# Patient Record
Sex: MALE | Race: WHITE | NOT HISPANIC OR LATINO | Employment: UNEMPLOYED | URBAN - METROPOLITAN AREA
[De-identification: names, ages, dates, MRNs, and addresses within clinical notes are randomized per-mention and may not be internally consistent; named-entity substitution may affect disease eponyms.]

---

## 2017-02-21 ENCOUNTER — OFFICE VISIT (OUTPATIENT)
Dept: OPTOMETRY | Facility: CLINIC | Age: 27
End: 2017-02-21
Payer: COMMERCIAL

## 2017-02-21 DIAGNOSIS — S05.02XA CORNEA ABRASION, LEFT, INITIAL ENCOUNTER: Primary | ICD-10-CM

## 2017-02-21 PROCEDURE — 92012 INTRM OPH EXAM EST PATIENT: CPT | Mod: S$GLB,,, | Performed by: OPTOMETRIST

## 2017-02-21 PROCEDURE — 99999 PR PBB SHADOW E&M-EST. PATIENT-LVL II: CPT | Mod: PBBFAC,,, | Performed by: OPTOMETRIST

## 2017-02-21 RX ORDER — AZITHROMYCIN 250 MG/1
TABLET, FILM COATED ORAL
Refills: 0 | COMMUNITY
Start: 2017-01-06 | End: 2018-03-26

## 2017-02-21 RX ORDER — TOBRAMYCIN 3 MG/ML
1 SOLUTION/ DROPS OPHTHALMIC 4 TIMES DAILY
Qty: 5 ML | Refills: 0 | Status: SHIPPED | OUTPATIENT
Start: 2017-02-21 | End: 2017-03-03

## 2017-02-21 RX ORDER — HYDROCODONE BITARTRATE AND IBUPROFEN 7.5; 2 MG/1; MG/1
TABLET, FILM COATED ORAL
Refills: 0 | COMMUNITY
Start: 2017-01-06 | End: 2018-03-26

## 2017-02-21 RX ORDER — TRAMADOL HYDROCHLORIDE 50 MG/1
TABLET ORAL
Refills: 1 | COMMUNITY
Start: 2016-12-30 | End: 2018-03-26

## 2017-02-21 RX ORDER — AMOXICILLIN AND CLAVULANATE POTASSIUM 875; 125 MG/1; MG/1
TABLET, FILM COATED ORAL
Refills: 0 | COMMUNITY
Start: 2016-12-30 | End: 2018-03-26

## 2017-02-21 NOTE — MR AVS SNAPSHOT
[facility]   [facility]  [place] [address]  Phone: [phone]  Fax: [phone]                  Du Hamilton   2017 10:30 AM   Office Visit    Description:  Male : 1990   Provider:  Robin Medrano OD   Department:  [facility]           Reason for Visit     Concerns About Ocular Health           Diagnoses this Visit        Comments    Cornea abrasion, left, initial encounter    -  Primary            To Do List           Goals (5 Years of Data)     None      Follow-Up and Disposition     Return if symptoms worsen or fail to improve.       These Medications        Disp Refills Start End    tobramycin sulfate 0.3% (TOBREX) 0.3 % ophthalmic solution 5 mL 0 2017 3/3/2017    Place 1 drop into the left eye 4 (four) times daily. - Left Eye    Pharmacy: [facility] [address] [place] [address] [place], [place] [address] AT [facility]  #: [phone]         [facility] On Call     [facility] On Call Nurse Care Line -  Assistance  Registered nurses in the [facility] provide clinical advisement, health education, appointment booking, and other advisory services.  Call for this free service at [phone].             Medications           Message regarding Medications     Verify the changes and/or additions to your medication regime listed below are the same as discussed with your clinician today.  If any of these changes or additions are incorrect, please notify your healthcare provider.        START taking these NEW medications        Refills    tobramycin sulfate 0.3% (TOBREX) 0.3 % ophthalmic solution 0    Sig: Place 1 drop into the left eye 4 (four) times daily.    Class: Normal    Route: Left Eye           Verify that the below list of medications is an accurate representation of the medications you are currently taking.  If none reported, the list may be blank. If incorrect, please contact your healthcare provider.  Carry this list with you in case of emergency.           Current Medications     amoxicillin-clavulanate 875-125mg (AUGMENTIN) 875-125 mg per tablet TK 1 T PO  BID    azithromycin (Z-REDD) 250 MG tablet     hydrocodone-acetaminophen 5-325mg (NORCO) 5-325 mg per tablet Take 1 tablet by mouth every 6 (six) hours as needed for Pain.    hydrocodone-ibuprofen 7.5-200 mg (VICOPROFEN) 7.5-200 mg per tablet TK 1 T PO Q 4 TO 6 H PRN P    tobramycin sulfate 0.3% (TOBREX) 0.3 % ophthalmic solution Place 1 drop into the left eye 4 (four) times daily.    tramadol (ULTRAM) 50 mg tablet TK 1 T PO  BID           Clinical Reference Information           Allergies as of 2/21/2017     No Known Allergies      Immunizations Administered on Date of Encounter - 2/21/2017     None      [facility] Sign-Up     Activating your [facility] account is as easy as 1-2-3!     1) Visit [email or web address], select Sign Up Now, enter this activation code and your date of birth, then select Next.  [email or web address]  Expires: 4/7/2017 10:49 AM      2) Create a username and password to use when you visit [facility] in the future and select a security question in case you lose your password and select Next.    3) Enter your e-mail address and click Sign Up!    Additional Information  If you have questions, please e-mail [email or web address] or call [phone] to talk to our [facility] staff. Remember, [facility] is NOT to be used for urgent needs. For medical emergencies, dial 911.         Smoking Cessation     If you would like to quit smoking:   You may be eligible for free services if you are a [place] resident and started smoking cigarettes before September 1, 1988.  Call the Smoking Cessation Trust (SCT) toll free at (280) 055-0056 or (598) 976-1010.   Call 2-203-QUIT-NOW if you do not meet the above criteria.            Language Assistance Services     ATTENTION: Language assistance services are available, free of charge. Please call [phone].       ATENCIÓN: Si habla español, tiene a roque disposición servicios gratuitos de asistencia lingüística. Llred al [phone].     CHÚ Ý: N?u b?n nói Ti?ng Vi?t, có các d?ch v? h? tr? ngôn ng? mi?n phí dành cho b?n. G?i s? [phone].         [facility] complies with applicable Federal civil rights laws and does not discriminate on the basis of race, color, national origin, age, disability, or sex.

## 2017-02-21 NOTE — PROGRESS NOTES
HPI     DLS: 6/9/2016 with Dr. 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 Flushed eye with eye wash    +FBS OS   +Tearing   +Photophobia   +Redness   +White mucous in the left eye this morning.   Pt states he removed a black piece inside the left lower lid    No gtts        Last edited by Annette Rollins on 2/21/2017 10:38 AM.     ROS     Positive for: Eyes    Negative for: Constitutional, Gastrointestinal, Neurological, Skin,   Genitourinary, Musculoskeletal, HENT, Endocrine, Cardiovascular,   Respiratory, Psychiatric, Allergic/Imm, Heme/Lymph    Last edited by Robin Medrano, OD on 2/21/2017 10:55 AM. (History)        Assessment /Plan     For exam results, see Encounter Report.    Cornea abrasion, left, initial encounter  -     tobramycin sulfate 0.3% (TOBREX) 0.3 % ophthalmic solution; Place 1 drop into the left eye 4 (four) times daily.  Dispense: 5 mL; Refill: 0      Pt was in shop yesterday and felt dust blow into OS.  Rinsed eye w eyewash, but still irritation today.  No FB noted, but has superior abrasion most likely from FB he washed out    PLAN:    1. TOBRAMYCIN QID OS X 1 week  2. Pt will call if sx persist beyond 1 week, otherwise he wishes to rtc for full exam since has some problems seeing small print on blueprints

## 2018-03-26 ENCOUNTER — PATIENT MESSAGE (OUTPATIENT)
Dept: FAMILY MEDICINE | Facility: CLINIC | Age: 28
End: 2018-03-26

## 2018-03-26 ENCOUNTER — OFFICE VISIT (OUTPATIENT)
Dept: FAMILY MEDICINE | Facility: CLINIC | Age: 28
End: 2018-03-26
Payer: COMMERCIAL

## 2018-03-26 ENCOUNTER — LAB VISIT (OUTPATIENT)
Dept: LAB | Facility: HOSPITAL | Age: 28
End: 2018-03-26
Attending: FAMILY MEDICINE
Payer: COMMERCIAL

## 2018-03-26 ENCOUNTER — TELEPHONE (OUTPATIENT)
Dept: FAMILY MEDICINE | Facility: CLINIC | Age: 28
End: 2018-03-26

## 2018-03-26 VITALS
SYSTOLIC BLOOD PRESSURE: 114 MMHG | HEART RATE: 73 BPM | DIASTOLIC BLOOD PRESSURE: 70 MMHG | OXYGEN SATURATION: 97 % | BODY MASS INDEX: 29.03 KG/M2 | TEMPERATURE: 98 F | WEIGHT: 191.56 LBS | HEIGHT: 68 IN

## 2018-03-26 DIAGNOSIS — S02.2XXS OPEN FRACTURE OF NASAL BONE, SEQUELA: ICD-10-CM

## 2018-03-26 DIAGNOSIS — Z00.00 VISIT FOR WELL MAN HEALTH CHECK: Primary | ICD-10-CM

## 2018-03-26 DIAGNOSIS — R06.83 SNORING: ICD-10-CM

## 2018-03-26 DIAGNOSIS — D64.9 NORMOCYTIC ANEMIA: ICD-10-CM

## 2018-03-26 DIAGNOSIS — F17.200 TOBACCO DEPENDENCE: ICD-10-CM

## 2018-03-26 DIAGNOSIS — Z71.89 ENCOUNTER FOR HERB AND VITAMIN SUPPLEMENT MANAGEMENT: ICD-10-CM

## 2018-03-26 DIAGNOSIS — Z00.00 VISIT FOR WELL MAN HEALTH CHECK: ICD-10-CM

## 2018-03-26 DIAGNOSIS — Z11.3 ROUTINE SCREENING FOR STI (SEXUALLY TRANSMITTED INFECTION): ICD-10-CM

## 2018-03-26 LAB
25(OH)D3+25(OH)D2 SERPL-MCNC: 51 NG/ML
ALBUMIN SERPL BCP-MCNC: 3.6 G/DL
ALP SERPL-CCNC: 54 U/L
ALT SERPL W/O P-5'-P-CCNC: 45 U/L
ANION GAP SERPL CALC-SCNC: 8 MMOL/L
AST SERPL-CCNC: 43 U/L
BASOPHILS # BLD AUTO: 0.04 K/UL
BASOPHILS NFR BLD: 0.3 %
BILIRUB SERPL-MCNC: 0.3 MG/DL
BUN SERPL-MCNC: 22 MG/DL
CALCIUM SERPL-MCNC: 9.2 MG/DL
CHLORIDE SERPL-SCNC: 104 MMOL/L
CHOLEST SERPL-MCNC: 158 MG/DL
CHOLEST/HDLC SERPL: 6.3 {RATIO}
CO2 SERPL-SCNC: 28 MMOL/L
CREAT SERPL-MCNC: 1.1 MG/DL
DIFFERENTIAL METHOD: ABNORMAL
EOSINOPHIL # BLD AUTO: 0.3 K/UL
EOSINOPHIL NFR BLD: 2 %
ERYTHROCYTE [DISTWIDTH] IN BLOOD BY AUTOMATED COUNT: 13.3 %
EST. GFR  (AFRICAN AMERICAN): >60 ML/MIN/1.73 M^2
EST. GFR  (NON AFRICAN AMERICAN): >60 ML/MIN/1.73 M^2
ESTIMATED AVG GLUCOSE: 88 MG/DL
GLUCOSE SERPL-MCNC: 82 MG/DL
HBA1C MFR BLD HPLC: 4.7 %
HCT VFR BLD AUTO: 43.3 %
HDLC SERPL-MCNC: 25 MG/DL
HDLC SERPL: 15.8 %
HGB BLD-MCNC: 14.2 G/DL
IMM GRANULOCYTES # BLD AUTO: 0.05 K/UL
IMM GRANULOCYTES NFR BLD AUTO: 0.4 %
LDLC SERPL CALC-MCNC: 93.4 MG/DL
LYMPHOCYTES # BLD AUTO: 2.3 K/UL
LYMPHOCYTES NFR BLD: 18.7 %
MCH RBC QN AUTO: 31.4 PG
MCHC RBC AUTO-ENTMCNC: 32.8 G/DL
MCV RBC AUTO: 96 FL
MONOCYTES # BLD AUTO: 1.1 K/UL
MONOCYTES NFR BLD: 8.6 %
NEUTROPHILS # BLD AUTO: 8.7 K/UL
NEUTROPHILS NFR BLD: 70 %
NONHDLC SERPL-MCNC: 133 MG/DL
NRBC BLD-RTO: 0 /100 WBC
PLATELET # BLD AUTO: 278 K/UL
PMV BLD AUTO: 10.7 FL
POTASSIUM SERPL-SCNC: 4.3 MMOL/L
PROT SERPL-MCNC: 6.8 G/DL
RBC # BLD AUTO: 4.52 M/UL
SODIUM SERPL-SCNC: 140 MMOL/L
T4 FREE SERPL-MCNC: 0.85 NG/DL
TRIGL SERPL-MCNC: 198 MG/DL
TSH SERPL DL<=0.005 MIU/L-ACNC: 0.19 UIU/ML
WBC # BLD AUTO: 12.37 K/UL

## 2018-03-26 PROCEDURE — 82306 VITAMIN D 25 HYDROXY: CPT

## 2018-03-26 PROCEDURE — 80053 COMPREHEN METABOLIC PANEL: CPT

## 2018-03-26 PROCEDURE — 80061 LIPID PANEL: CPT

## 2018-03-26 PROCEDURE — 36415 COLL VENOUS BLD VENIPUNCTURE: CPT | Mod: PO

## 2018-03-26 PROCEDURE — 99385 PREV VISIT NEW AGE 18-39: CPT | Mod: S$GLB,,, | Performed by: FAMILY MEDICINE

## 2018-03-26 PROCEDURE — 85025 COMPLETE CBC W/AUTO DIFF WBC: CPT

## 2018-03-26 PROCEDURE — 83036 HEMOGLOBIN GLYCOSYLATED A1C: CPT

## 2018-03-26 PROCEDURE — 99999 PR PBB SHADOW E&M-EST. PATIENT-LVL III: CPT | Mod: PBBFAC,,, | Performed by: FAMILY MEDICINE

## 2018-03-26 PROCEDURE — 84439 ASSAY OF FREE THYROXINE: CPT

## 2018-03-26 PROCEDURE — 84443 ASSAY THYROID STIM HORMONE: CPT

## 2018-03-26 RX ORDER — FLUTICASONE PROPIONATE 50 MCG
1 SPRAY, SUSPENSION (ML) NASAL 2 TIMES DAILY
Qty: 1 BOTTLE | Refills: 11 | Status: SHIPPED | OUTPATIENT
Start: 2018-03-26 | End: 2018-04-25

## 2018-03-26 NOTE — PATIENT INSTRUCTIONS
If you would like to quit smoking:   You may be eligible for free services if you are a [place] resident and started smoking cigarettes before September 1, 1988.  Call the Smoking Cessation Trust (SCT) toll free at (856) 127-5065 or (474) 456-1206.   Call 8-373-QUIT-NOW if you do not meet the above criteria.      Kicking the Smoking Habit  If you smoke, quitting is one of the best changes you can make for your heart and your overall health. Your risk of heart attack goes down within one day of putting out that last cigarette. As you go longer without smoking, your risk goes down even more. Quitting isnt easy, but millions of people have done it. You can, too. Its never too late to quit.  Getting started  Boost your chances of success by deciding on your quit plan. Your health care provider and cardiac rehab team can help you develop this plan. Even if youve already quit, its easy to slip back into smoking.  Your plan can help you avoid and recover from relapse.  In any case, start by setting a date to quit within a month, and do it.    Keys to your quit plan  · Talk to your healthcare provider about prescription medicines and nicotine replacement products that help stop the urge to smoke.   · Join a support group or quit smoking program. Talking with others about the challenges of quitting can help you get through them.  · Ask other smokers in your household to quit with you.  · Look for the cues in your life that you associate with smoking and avoid them.  Track your triggers  What gives you that X-ingq-k-cigarette feeling? List all the situations that make you want a cigarette. Then think of other ways to deal with these situations. Here are some examples:  Situation How I'll handle it   Finishing a meal Get up from the table and take a walk   Having an argument Find a quiet place and breathe deeply   Feeling lonely or bored Call a friend to talk         Tips for quitting successfully  · List the  benefits of quitting such as reducing heart risks and saving money. Keep this list and review it whenever you feel like smoking.  · Get support. Let your friends know you may call them to chat when you have an urge to smoke.  · If youve tried to quit before without success, this time avoid the triggers that may cause the relapse.  · Make the most of slip-ups. Try to learn from them, and then get back on track.  · Be accountable to your friends and your calendar so that you stay on track.  For family and friends  · Be supportive and patient. Quitting smoking can be difficult and stressful.  · If you smoke, nows a great time to quit. Even if you dont quit, never smoke around your loved one. Secondhand smoke is dangerous to his or her heart.  · The best goals are accomplished in teams. Remember that when your loved one states he or she wants to stop smoking.  Date Last Reviewed: 7/1/2016  © 6847-6475 [employer]. [address] [place] [address], [place] [address]. All rights reserved. This information is not intended as a substitute for professional medical care. Always follow your healthcare professional's instructions.      4 Steps for Eating Healthier  Changing the way you eat can improve your health. It can lower your cholesterol and blood pressure, and help you stay at a healthy weight. Your diet doesnt have to be bland and boring to be healthy. Just watch your calories and follow these steps:    1. Eat fewer unhealthy fats  · Choose more fish and lean meats instead of fatty cuts of meat.  · Skip butter and lard, and use less margarine.  · Pass on foods that have palm, coconut, or hydrogenated oils.  · Eat fewer high-fat dairy foods like cheese, ice cream, and whole milk.  · Get a heart-healthy cookbook and try some low-fat recipes.  2. Go light on salt  · Keep the saltshaker off the table.  · Limit high-salt ingredients, such as soy sauce, bouillon, and garlic salt.  · Instead of adding salt when  cooking, season your food with herbs and flavorings. Try lemon, garlic, and onion.  · Limit convenience foods, such as boxed or canned foods and restaurant food.  · Read food labels and choose lower-sodium options.  3. Limit sugar  · Pause before you add sugars to pancakes, cereal, coffee, or tea. This includes white and brown table sugar, syrup, honey, and molasses. Cut your usual amount by half.  · Use non-sugar sweeteners. Stevia, aspartame, and sucralose can satisfy a sweet tooth without adding calories.  · Swap out sugar-filled soda and other drinks. Buy sugar-free or low-calorie beverages. Remember water is always the best choice.  · Read labels and choose foods with less added sugar. Keep in mind that dairy foods and foods with fruit will have some natural sugar.  · Cut the sugar in recipes by 1/3 to 1/2. Boost the flavor with extracts like almond, vanilla, or orange. Or add spices such as cinnamon or nutmeg.  4. Eat more fiber  · Eat fresh fruits and vegetables every day.  · Boost your diet with whole grains. Go for oats, whole-grain rice, and bran.  · Add beans and lentils to your meals.  · Drink more water to match your fiber increase. This is to help prevent constipation.  Date Last Reviewed: 5/11/2015  © 8981-7784 [employer]. [address] [place] [address], [place] [address]. All rights reserved. This information is not intended as a substitute for professional medical care. Always follow your healthcare professional's instructions.

## 2018-03-26 NOTE — PROGRESS NOTES
Subjective:       Patient ID: Du Hamilton is a 27 y.o. male.    Chief Complaint: Annual Exam ([facility])    Du Hamilton is a 27 y.o. male who presents today to establish care.     Diet: his diet is high protein; chicken, steak, egg, salad, shrimp. He eats grains and nuts along with wheat bread. He eats rare fast food. He eats a lot of fried food at home, when he is not traveling. No soda intake. He drinks coffee at work. He drinks juice about 3-4 times a week.   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 He lives [place], his girlfriend. He has a pittbull at home. His girlfriend smokes cigarettes also.         Review of Systems   Constitutional: Negative for chills, fever and unexpected weight change.   Respiratory: Negative for shortness of breath.    Cardiovascular: Negative for chest pain.   Gastrointestinal: Negative for constipation, diarrhea, nausea and vomiting.   Genitourinary: Negative for difficulty urinating.   Musculoskeletal: Negative for arthralgias.   Skin: Negative for rash.         Health Maintenance Due   Topic Date Due    Lipid Panel  1990    Pneumococcal PPSV23 (Medium Risk) (1) 07/18/2006    Influenza Vaccine  08/01/2017     Immunization History   Administered Date(s) Administered    DTaP 1990, 05/08/1989, 08/24/1989, 08/12/1992    HIB 08/18/1992    Hepatitis B, Pediatric/Adolescent 02/11/2003, 08/14/2003, 10/08/2007    IPV 1988, 05/08/1989, 05/05/1990, 08/18/1992    MMR 08/18/1992, 03/22/1994    Td (ADULT) 02/11/2003     Objective:     Vitals:    03/26/18 1308   BP: 114/70   Pulse: 73   Temp: 98.4 °F (36.9 °C)        Physical Exam   Constitutional: He is oriented to person, place, and time. He appears well-developed and well-nourished.   HENT:   Head: Normocephalic  and atraumatic.   Right Ear: Tympanic membrane, external ear and ear canal normal.   Left Ear: Tympanic membrane, external ear and ear canal normal.   Mouth/Throat: Oropharynx is clear and moist and mucous membranes are normal. No tonsillar exudate.   Nasal congestion noted   Eyes: Conjunctivae and EOM are normal. Pupils are equal, round, and reactive to light.   Neck: Normal range of motion. Neck supple.   Cardiovascular: Normal rate, regular rhythm and normal heart sounds.    Pulmonary/Chest: Effort normal and breath sounds normal.   Abdominal: Soft. Bowel sounds are normal. He exhibits no distension.   Musculoskeletal: He exhibits no edema.   Neurological: He is alert and oriented to person, place, and time. A sensory deficit is present. No cranial nerve deficit. He exhibits normal muscle tone. Coordination normal.   Mildly diminished sensation on the left knee, this is chronic s/p surgery   Skin: Skin is warm and dry.   Psychiatric: He has a normal mood and affect. His speech is normal and behavior is normal. Judgment and thought content normal.   Nursing note and vitals reviewed.      Assessment:       1. Visit for [facility] health check    2. Tobacco dependence    3. BMI 29.0-29.9,adult    4. Normocytic anemia    5. Encounter for herb and vitamin supplement management    6. Open fracture of nasal bone, sequela    7. Snoring    8. Routine screening for STI (sexually transmitted infection)        Plan:         Visit for [facility] health check  ?Avoid tobacco  ?Be physically active  ?Maintain a healthy weight  ?Eat a diet rich in fruits, vegetables, and whole grains, and low in saturated/trans fat  ?Limit alcohol consumption  ?Protect against sexually transmitted infections  ?Avoid excess sun  -     CBC auto differential; Future; Expected date: 03/26/2018  -     Comprehensive metabolic panel; Future; Expected date: 03/26/2018  -     Hemoglobin A1c; Future; Expected date: 03/26/2018  -     Lipid panel; Future;  Expected date: 03/26/2018  -     TSH; Future; Expected date: 03/26/2018  -     Vitamin D; Future; Expected date: 03/26/2018  -     T4, free; Future; Expected date: 03/26/2018  -     C. trachomatis/N. gonorrhoeae by AMP DNA Urine  -     HIV-1 and HIV-2 antibodies; Future; Expected date: 03/26/2018  -     RPR; Future; Expected date: 03/26/2018    Tobacco dependence  Desires to quit  Doesn't desire oral medication at this time  Smoking cessation counseling performed  OTC patches or gum to be tried  Handout given    BMI 29.0-29.9,adult  Continue good eating habits and weight training/cardio  Decrease fried food intake    Normocytic anemia  Noted on previous labs, repeated today  -     CBC auto differential; Future; Expected date: 03/26/2018    Encounter for herb and vitamin supplement management  On multiple OTC supplements, counseled to stop them all and just eat healthy    Open fracture of nasal bone, sequela/Snoring  flonase for now  Consider ENT vs plastic surgery to help alleviate snoring s/p nasal fracture after multiple trauma  -     fluticasone (FLONASE) 50 mcg/actuation nasal spray; 1 spray (50 mcg total) by Each Nare route 2 (two) times daily.  Dispense: 1 Bottle; Refill: 11    Routine screening for STI (sexually transmitted infection)  -     C. trachomatis/N. gonorrhoeae by AMP DNA Urine  -     HIV-1 and HIV-2 antibodies; Future; Expected date: 03/26/2018  -     RPR; Future; Expected date: 03/26/2018    Warning signs discussed, patient to call with any further issues or worsening of symptoms.

## 2018-03-27 ENCOUNTER — TELEPHONE (OUTPATIENT)
Dept: FAMILY MEDICINE | Facility: CLINIC | Age: 28
End: 2018-03-27

## 2018-03-27 DIAGNOSIS — E05.90 SUBCLINICAL HYPERTHYROIDISM: ICD-10-CM

## 2018-03-27 DIAGNOSIS — R79.89 ELEVATED LIVER FUNCTION TESTS: ICD-10-CM

## 2018-03-27 DIAGNOSIS — E78.6 LOW HDL (UNDER 40): Primary | ICD-10-CM

## 2018-03-27 NOTE — TELEPHONE ENCOUNTER
Called patient and discussed blood work with him.     He has Subclinical hyperthyroidism. Follow up blood work has been ordered.   He has high triglycerides and low HDL, cholesterol recs sent via the portal.   Elevated LFT: likely due to heavy drinking over the weekend. Reordered, schedule repeat in 12 weeks.       Please order thyroid tests this week.   LFT: 12 weeks  Endocrine referral: when possible.

## 2018-05-22 ENCOUNTER — TELEPHONE (OUTPATIENT)
Dept: FAMILY MEDICINE | Facility: CLINIC | Age: 28
End: 2018-05-22

## 2018-05-22 ENCOUNTER — PATIENT MESSAGE (OUTPATIENT)
Dept: FAMILY MEDICINE | Facility: CLINIC | Age: 28
End: 2018-05-22

## 2018-05-25 ENCOUNTER — LAB VISIT (OUTPATIENT)
Dept: LAB | Facility: HOSPITAL | Age: 28
End: 2018-05-25
Attending: FAMILY MEDICINE
Payer: COMMERCIAL

## 2018-05-25 ENCOUNTER — OFFICE VISIT (OUTPATIENT)
Dept: FAMILY MEDICINE | Facility: CLINIC | Age: 28
End: 2018-05-25
Payer: COMMERCIAL

## 2018-05-25 VITALS
HEART RATE: 88 BPM | TEMPERATURE: 98 F | WEIGHT: 192.69 LBS | OXYGEN SATURATION: 97 % | SYSTOLIC BLOOD PRESSURE: 118 MMHG | HEIGHT: 68 IN | DIASTOLIC BLOOD PRESSURE: 60 MMHG | BODY MASS INDEX: 29.2 KG/M2

## 2018-05-25 DIAGNOSIS — R79.89 ELEVATED LIVER FUNCTION TESTS: ICD-10-CM

## 2018-05-25 DIAGNOSIS — E05.90 SUBCLINICAL HYPERTHYROIDISM: ICD-10-CM

## 2018-05-25 DIAGNOSIS — F17.200 TOBACCO DEPENDENCE: ICD-10-CM

## 2018-05-25 DIAGNOSIS — Z01.00 ENCOUNTER FOR VISION SCREENING: ICD-10-CM

## 2018-05-25 DIAGNOSIS — E05.90 SUBCLINICAL HYPERTHYROIDISM: Primary | ICD-10-CM

## 2018-05-25 DIAGNOSIS — E78.6 LOW HDL (UNDER 40): ICD-10-CM

## 2018-05-25 DIAGNOSIS — Z00.00 VISIT FOR WELL MAN HEALTH CHECK: ICD-10-CM

## 2018-05-25 DIAGNOSIS — Z91.09 ENVIRONMENTAL ALLERGIES: ICD-10-CM

## 2018-05-25 DIAGNOSIS — Z11.3 ROUTINE SCREENING FOR STI (SEXUALLY TRANSMITTED INFECTION): ICD-10-CM

## 2018-05-25 LAB
ALBUMIN SERPL BCP-MCNC: 3.9 G/DL
ALP SERPL-CCNC: 62 U/L
ALT SERPL W/O P-5'-P-CCNC: 48 U/L
ANION GAP SERPL CALC-SCNC: 7 MMOL/L
AST SERPL-CCNC: 45 U/L
BILIRUB SERPL-MCNC: 0.5 MG/DL
BUN SERPL-MCNC: 16 MG/DL
CALCIUM SERPL-MCNC: 9.5 MG/DL
CHLORIDE SERPL-SCNC: 106 MMOL/L
CHOLEST SERPL-MCNC: 161 MG/DL
CHOLEST/HDLC SERPL: 6.2 {RATIO}
CO2 SERPL-SCNC: 28 MMOL/L
CREAT SERPL-MCNC: 1.1 MG/DL
EST. GFR  (AFRICAN AMERICAN): >60 ML/MIN/1.73 M^2
EST. GFR  (NON AFRICAN AMERICAN): >60 ML/MIN/1.73 M^2
GLUCOSE SERPL-MCNC: 82 MG/DL
HDLC SERPL-MCNC: 26 MG/DL
HDLC SERPL: 16.1 %
LDLC SERPL CALC-MCNC: 109.4 MG/DL
NONHDLC SERPL-MCNC: 135 MG/DL
POTASSIUM SERPL-SCNC: 4 MMOL/L
PROT SERPL-MCNC: 7.1 G/DL
SODIUM SERPL-SCNC: 141 MMOL/L
T3FREE SERPL-MCNC: 2.8 PG/ML
T4 FREE SERPL-MCNC: 0.78 NG/DL
TRIGL SERPL-MCNC: 128 MG/DL
TSH SERPL DL<=0.005 MIU/L-ACNC: 0.43 UIU/ML

## 2018-05-25 PROCEDURE — 86592 SYPHILIS TEST NON-TREP QUAL: CPT

## 2018-05-25 PROCEDURE — 84439 ASSAY OF FREE THYROXINE: CPT

## 2018-05-25 PROCEDURE — 80053 COMPREHEN METABOLIC PANEL: CPT

## 2018-05-25 PROCEDURE — 84443 ASSAY THYROID STIM HORMONE: CPT

## 2018-05-25 PROCEDURE — 80061 LIPID PANEL: CPT

## 2018-05-25 PROCEDURE — 83520 IMMUNOASSAY QUANT NOS NONAB: CPT

## 2018-05-25 PROCEDURE — 99999 PR PBB SHADOW E&M-EST. PATIENT-LVL IV: CPT | Mod: PBBFAC,,, | Performed by: FAMILY MEDICINE

## 2018-05-25 PROCEDURE — 84481 FREE ASSAY (FT-3): CPT

## 2018-05-25 PROCEDURE — 86703 HIV-1/HIV-2 1 RESULT ANTBDY: CPT

## 2018-05-25 PROCEDURE — 99214 OFFICE O/P EST MOD 30 MIN: CPT | Mod: S$GLB,,, | Performed by: FAMILY MEDICINE

## 2018-05-25 PROCEDURE — 3008F BODY MASS INDEX DOCD: CPT | Mod: CPTII,S$GLB,, | Performed by: FAMILY MEDICINE

## 2018-05-25 RX ORDER — VARENICLINE TARTRATE 0.5 (11)-1
KIT ORAL
Qty: 42 TABLET | Refills: 0 | Status: SHIPPED | OUTPATIENT
Start: 2018-05-25 | End: 2023-01-14

## 2018-05-25 RX ORDER — FLUTICASONE PROPIONATE 50 MCG
1 SPRAY, SUSPENSION (ML) NASAL DAILY
Qty: 1 BOTTLE | Refills: 11 | Status: SHIPPED | OUTPATIENT
Start: 2018-05-25 | End: 2018-06-24

## 2018-05-25 NOTE — PATIENT INSTRUCTIONS
LDL - bad type - improves with diet and meds. (typically statins)             May not improve significantly with exercise alone             Ideal range 60 -100         HDL - good type - improves with exercise             Ideal range 50 - 65    TGs (triglycerides) - also bad - can change very quickly and considerably with food - improves with diet and exercise            In some cases a low carbohydrate diet will lower TGs better than a low fat diet.            Ideal range       Fat and cholesterol in food:    Most foods that are high in cholesterol are also high in saturated fat. A handful of foods are high in cholesterol but do not contain any saturated fat: eggs, shrimp, crab legs and crawfish. Its OK to eat these foods but do not deep cooper them.    Saturated fat is the bad fat - you should limit your intake of that. Deep fried foods, meats and other animal fats are high in saturated fat. Cookies and most dessert and cakes are usually high in saturated fat and sugar.      Unsaturated fat is the good fat. It contains the same number of calories as saturated fat but does not contribute to blockages. The Mediterranean style diet encourages the intake of unsaturated fat - olive oil, avocado and unsalted nuts.    Do not use stick butter or stick margarine. Butter that comes in a tub or margarine such as Benecol is OK to use.    Trans fats should also be avoided. Most foods that are labelled as containing 0 gms of trans fat can still contain several hundred milligrams of trans fat: creamer, margarine, refrigerator dough, deep fried foods, ready made frosting, potato, corn and torilla chips, cakes, cookies, pie crusts and crackers containing shortening made with hydrogenated vegetable oil.    .  Ganglion Cyst: Hand    A ganglion cyst is a firm, fluid-filled lump that can suddenly appear on the front or back of the wrist or at the base of a finger. These cysts grow from normal tissue in the wrist and fingers, and  range in size from a pea to a peach pit. Although ganglion cysts are common, they dont spread, and they dont become cancerous. They can occur after an injury, but many times it isnt known why they grow. Ganglion cysts can change in size, and may go away on their own.  Symptoms  A ganglion cyst is sometimes painful, especially when it first occurs. Constantly using your hand or wrist can make the cyst enlarge and hurt more. Some hand and wrist movements, such as grasping things, may also be difficult.  How a ganglion cyst develops  Your wrist and hand are made up of many small bones that meet at joints. Tendons attach muscles to the bones at the joints. The tendons allow the joints to bend and straighten. Both tendons and joints are lined with tissue called synovium. This tissue makes a thick fluid that keeps the joints and tendons moving easily. Sometimes the tissue balloons out from the joint or tendons and forms a cyst. As the cyst fills with fluid and grows, it appears as a lump you can feel.  Where ganglion cysts occur  A ganglion cyst can occur anywhere on the hand near a joint. Cysts most commonly appear on the back or palm side of the wrist, or on the palm at the base of a finger. Your doctor can usually diagnose a cyst by examining the lump. He or she may draw off a little fluid or order an X-ray to rule out other problems.  Treating a ganglion cyst  Your healthcare provider may just watch your ganglion cyst. Many shrink and become painless without treatment. Some disappear altogether. If the cyst is unsightly or painful, or makes it hard for you to use your hand, your healthcare provider can treat it or, if needed, remove it surgically.  Nonsurgical treatment  To shrink the cyst, your provider may remove (aspirate) the fluid with a needle. If the cyst hurts, your provider may also give you an injection of an anti-inflammatory, such as cortisone, to relieve the irritation. Your hand may then be wrapped to  help keep the cyst from recurring.  Surgery  If the cyst reappears after treatment, your healthcare provider may remove it surgically. A section of the tissue that lines the joint or tendon is removed along with the cyst. This helps prevent another cyst from forming, although recurrence of the cyst is still possible after surgery. Usually, only your hand or arm is numbed, and you can go home a few hours after surgery. Your hand may be in a splint for several days.  Date Last Reviewed: 9/10/2015  © 4845-1952 [employer]. [address] [place] [address]. All rights reserved. This information is not intended as a substitute for professional medical care. Always follow your healthcare professional's instructions.

## 2018-05-25 NOTE — PROGRESS NOTES
Subjective:       Patient ID: Du Hamilton is a 27 y.o. male.    Chief Complaint: Follow-up (3 mos)    Du is a 27 y.o. male who presents today for desire to quit smoking, follow up on his lab work, and elevated LFTs.     Today, he states that ge desires to quit smoking. He is smoking about 0.5 PPD, this has been going on intermittently for about 10 years. He is motivated and ready to quit. He desires chantix.     His lab work, as below, was reviewed in detail.  He was found to have subclinical hyperthyroidism for the second lab test in a row.  The first test was not ordered by me and it does not appear that there was any follow-up.  Patient did not get his follow-up blood work drawn.  He has not gone to see endocrinology yet either.    During his lab work, he was also found to have elevated LFTs.  He states that right before he got the blood work done, he had been drinking heavily.  He now states that he has been drinking 2-3 weeks.  He desires to get his blood work redrawn.    He was also found to have low HDL.  He desires to recheck his lipid panel as he has changed his diet and exercise.    He also desires STI screening.       Thyroid Problem   Presents for initial visit. Symptoms include dry skin and fatigue. Patient reports no constipation, diaphoresis, diarrhea, hair loss, hoarse voice, leg swelling, nail problem, palpitations, tremors, weight gain or weight loss. The symptoms have been stable. (Binge alcohol use)   Nicotine Dependence   Presents for initial visit. Symptoms include fatigue. Preferred tobacco types include cigarettes. His urge triggers include company of smokers and drinking alcohol. He smokes < 1/2 a pack of cigarettes per day. Past treatments include nothing. Compliance with prior treatments has been variable. Du is ready to quit. There is no history of drug use. (Binge alcohol use)     Review of Systems   Constitutional: Positive for fatigue. Negative for diaphoresis, weight gain and  weight loss.   HENT: Negative for hoarse voice.    Cardiovascular: Negative for palpitations.   Gastrointestinal: Negative for constipation and diarrhea.   Neurological: Negative for tremors.         Results for orders placed or performed in visit on 03/26/18   CBC auto differential   Result Value Ref Range    WBC 12.37 3.90 - 12.70 K/uL    RBC 4.52 (L) 4.60 - 6.20 M/uL    Hemoglobin 14.2 14.0 - 18.0 g/dL    Hematocrit 43.3 40.0 - 54.0 %    MCV 96 82 - 98 fL    MCH 31.4 (H) 27.0 - 31.0 pg    MCHC 32.8 32.0 - 36.0 g/dL    RDW 13.3 11.5 - 14.5 %    Platelets 278 150 - 350 K/uL    MPV 10.7 9.2 - 12.9 fL   Comprehensive metabolic panel   Result Value Ref Range    Sodium 140 136 - 145 mmol/L    Potassium 4.3 3.5 - 5.1 mmol/L    Chloride 104 95 - 110 mmol/L    CO2 28 23 - 29 mmol/L    Glucose 82 70 - 110 mg/dL    BUN, Bld 22 (H) 6 - 20 mg/dL    Creatinine 1.1 0.5 - 1.4 mg/dL    Calcium 9.2 8.7 - 10.5 mg/dL    Total Protein 6.8 6.0 - 8.4 g/dL    Albumin 3.6 3.5 - 5.2 g/dL    Total Bilirubin 0.3 0.1 - 1.0 mg/dL    Alkaline Phosphatase 54 (L) 55 - 135 U/L    AST 43 (H) 10 - 40 U/L    ALT 45 (H) 10 - 44 U/L    Anion Gap 8 8 - 16 mmol/L    eGFR if African American >60.0 >60 mL/min/1.73 m^2    eGFR if non African American >60.0 >60 mL/min/1.73 m^2   Lipid panel   Result Value Ref Range    Cholesterol 158 120 - 199 mg/dL    Triglycerides 198 (H) 30 - 150 mg/dL    HDL 25 (L) 40 - 75 mg/dL    LDL Cholesterol 93.4 63.0 - 159.0 mg/dL    HDL/Chol Ratio 15.8 (L) 20.0 - 50.0 %    Total Cholesterol/HDL Ratio 6.3 (H) 2.0 - 5.0    Non-HDL Cholesterol 133 mg/dL   TSH   Result Value Ref Range    TSH 0.193 (L) 0.400 - 4.000 uIU/mL   T4, free   Result Value Ref Range    Free T4 0.85 0.71 - 1.51 ng/dL       Objective:     Vitals:    05/25/18 1602   BP: 118/60   Pulse: 88   Temp: 98.4 °F (36.9 °C)        Physical Exam   Constitutional: He is oriented to person, place, and time. He appears well-developed and well-nourished.   HENT:   Head:  Normocephalic and atraumatic.   Nasal congestion noted  Mild cobblestoning noted   Eyes: Conjunctivae are normal. Pupils are equal, round, and reactive to light.   Neck: Normal range of motion. Neck supple. No tracheal deviation present. No thyromegaly present.   Cardiovascular: Normal rate and regular rhythm.    Pulmonary/Chest: Effort normal and breath sounds normal.   Abdominal: Soft. There is no tenderness.   Musculoskeletal: He exhibits no edema.   Neurological: He is alert and oriented to person, place, and time.   Skin: Skin is warm. No rash noted.   Psychiatric: He has a normal mood and affect. His behavior is normal. Judgment and thought content normal.   Nursing note and vitals reviewed.      Assessment:       1. Subclinical hyperthyroidism    2. Elevated liver function tests    3. Tobacco dependence    4. Routine screening for STI (sexually transmitted infection)    5. Low HDL (under 40)    6. Encounter for vision screening    7. Environmental allergies        Plan:         Subclinical hyperthyroidism  Check TSH, T4, T3, thyrotripin receptor antibody  Endocrine referral.     Elevated liver function tests  CMP today    Tobacco dependence  -     varenicline (CHANTIX REDD) 0.5 mg (11)- 1 mg (42) tablet; Take one 0.5mg tablet by mouth once daily for 3 days, then increase to one 0.5mg tablet twice daily for 3 days, then increase to one 1mg tablet twice daily.    1 mg twice daily for 11 weeks; may consider a temporary or permanent dose reduction if usual dose is not tolerated.  Dispense: 42 tablet; Refill: 0    Routine screening for STI (sexually transmitted infection)  HIV, RPR, G/C today    Low HDL (under 40)  -     Lipid panel; Future; Expected date: 05/25/2018    Encounter for vision screening  -     Ambulatory referral to Optometry    Environmental allergies  -     fluticasone (FLONASE) 50 mcg/actuation nasal spray; 1 spray (50 mcg total) by Each Nare route once daily.  Dispense: 1 Bottle; Refill:  11      Warning signs discussed, patient to call with any further issues or worsening of symptoms.

## 2018-05-26 LAB — RPR SER QL: NORMAL

## 2018-05-28 ENCOUNTER — TELEPHONE (OUTPATIENT)
Dept: FAMILY MEDICINE | Facility: CLINIC | Age: 28
End: 2018-05-28

## 2018-05-28 DIAGNOSIS — R79.89 ELEVATED LIVER FUNCTION TESTS: Primary | ICD-10-CM

## 2018-05-28 DIAGNOSIS — E05.90 SUBCLINICAL HYPERTHYROIDISM: ICD-10-CM

## 2018-05-28 LAB — HIV 1+2 AB+HIV1 P24 AG SERPL QL IA: NEGATIVE

## 2018-05-28 NOTE — TELEPHONE ENCOUNTER
Please call the patient regarding his results. His STI screening (HIV and syphillis) was negative. His thyroid tests have normalized, but his autoimmune marker is not back yet. Since his thyroid tests appear to be normal, he doesn't need to see endocrine unless the last blood tests comes back positive. We can just monitor his TSH every 6-12 months. He continues to have elevated liver function tests. I have ordered an US of his liver and some other blood tests for this.     His urine G/C was never drawn, please schedule this at the same time as his other blood work.     [place], TSH/T4 is to be scheduled in 6-12 months. All other tests can be scheduled now.

## 2018-05-29 LAB — TSH RECEP AB SER-ACNC: <1 IU/L

## 2018-05-30 ENCOUNTER — TELEPHONE (OUTPATIENT)
Dept: FAMILY MEDICINE | Facility: CLINIC | Age: 28
End: 2018-05-30

## 2018-05-30 NOTE — TELEPHONE ENCOUNTER
Spoke with patient and informed STD work up negative. Informed pt he will have to submit the urine specimen in order for the G/C to be performed. Informed thyroid function has normalized and no need to f/u with Endo at this time. Will recheck thyroid in 6/12 months. Informed patient liver enzymes are still elevated and will need liver ultrasound and work up with labs. Patient reports he is leaving to go to [facility] and will not be back until July. Patient request all test to be done on July 20th. Instructed patient to completed test once he returns. Patient voices understanding.

## 2018-05-30 NOTE — TELEPHONE ENCOUNTER
----- Message from Karla Edward sent at 5/30/2018 12:20 PM CDT -----  No. [phone]   Patient returned your call.

## 2018-05-31 ENCOUNTER — OFFICE VISIT (OUTPATIENT)
Dept: OPTOMETRY | Facility: CLINIC | Age: 28
End: 2018-05-31
Payer: COMMERCIAL

## 2018-05-31 DIAGNOSIS — H04.123 DRY EYE SYNDROME OF BILATERAL LACRIMAL GLANDS: Primary | ICD-10-CM

## 2018-05-31 DIAGNOSIS — H17.9 CORNEAL OPACITY OF BOTH EYES: ICD-10-CM

## 2018-05-31 PROCEDURE — 92014 COMPRE OPH EXAM EST PT 1/>: CPT | Mod: S$GLB,,, | Performed by: OPTOMETRIST

## 2018-05-31 PROCEDURE — 99999 PR PBB SHADOW E&M-EST. PATIENT-LVL II: CPT | Mod: PBBFAC,,, | Performed by: OPTOMETRIST

## 2018-05-31 NOTE — LETTER
May 31, 2018      Justyn Arciniega, DO  [address] [facility] [place] [address]           [place] [facility]  [address]  [place] [address]  Phone: [phone]  Fax: [phone]          Patient: Du Hamilton   MR Number: [id number]   YOB: 1990   Date of Visit: 5/31/2018       Dear Dr. Justyn Arciniega:    Thank you for referring Du Hamilton to me for evaluation. Attached you will find relevant portions of my assessment and plan of care.    If you have questions, please do not hesitate to call me. I look forward to following Du Hamilton along with you.    Sincerely,    Marilu Olivier, OD    Enclosure  CC:  No Recipients    If you would like to receive this communication electronically, please contact [email or web address] or [phone] to request more information on [facility] access.    For providers and/or their staff who would like to refer a patient to [facility], please contact us through our one-stop-shop provider referral line, [facility] , at [phone].    If you feel you have received this communication in error or would no longer like to receive these types of communications, please e-mail [email or web address]
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
## 2020-05-30 ENCOUNTER — OFFICE VISIT (OUTPATIENT)
Dept: URGENT CARE | Facility: CLINIC | Age: 30
End: 2020-05-30
Payer: COMMERCIAL

## 2020-05-30 VITALS
BODY MASS INDEX: 29.55 KG/M2 | OXYGEN SATURATION: 96 % | TEMPERATURE: 97 F | WEIGHT: 195 LBS | HEIGHT: 68 IN | HEART RATE: 77 BPM

## 2020-05-30 DIAGNOSIS — S69.91XA HAND INJURY, RIGHT, INITIAL ENCOUNTER: ICD-10-CM

## 2020-05-30 DIAGNOSIS — S63.91XA HAND SPRAIN, RIGHT, INITIAL ENCOUNTER: Primary | ICD-10-CM

## 2020-05-30 PROCEDURE — 73130 X-RAY EXAM OF HAND: CPT | Mod: FY,RT,S$GLB, | Performed by: RADIOLOGY

## 2020-05-30 PROCEDURE — 73130 XR HAND COMPLETE 3 VIEW RIGHT: ICD-10-PCS | Mod: FY,RT,S$GLB, | Performed by: RADIOLOGY

## 2020-05-30 PROCEDURE — 99214 OFFICE O/P EST MOD 30 MIN: CPT | Mod: S$GLB,,, | Performed by: PHYSICIAN ASSISTANT

## 2020-05-30 PROCEDURE — 99214 PR OFFICE/OUTPT VISIT, EST, LEVL IV, 30-39 MIN: ICD-10-PCS | Mod: S$GLB,,, | Performed by: PHYSICIAN ASSISTANT
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
## 2020-05-30 NOTE — PATIENT INSTRUCTIONS
Hand Sprain  A sprain is a stretching or tearing of the ligaments that hold a joint together. There are no broken bones. Sprains take 3 to 6 weeks to heal. A sprained hand may be treated with a splint or elastic wrap for support.  Home care  · Keep your arm elevated to reduce pain and swelling. This is most important during the first 48 hours.  · Apply an ice pack over the injured area for 15 to 20 minutes every 3 to 6 hours. You should do this for the first 24 to 48 hours. You can make an ice pack by filling a plastic bag that seals at the top with ice cubes and then wrapping it with a thin towel. Continue the use of ice packs for relief of pain and swelling as needed. As the ice melts, be careful to avoid getting any wrap or splint wet. After 48 hours, apply heat (warm shower or warm bath) for 15 to 20 minutes several times a day, or alternate ice and heat.  · You may use over-the-counter pain medicine to control pain, unless another pain medicine was prescribed. If you have chronic liver or kidney disease or ever had a stomach ulcer or GI bleeding, talk with your healthcare provider before using these medicines.  · If you were given a splint or elastic wrap, wear it until your pain improves.  Follow-up care  Follow up with your healthcare provider as advised. Sometimes fractures dont show up on the first X-ray. Bruises and sprains can sometimes hurt as much as a fracture. These injuries can take time to heal completely. If your symptoms dont improve or they get worse, talk with your healthcare provider. You may need a repeat X-ray.  When to seek medical advice  Call your healthcare provider right away if any of these occur:  · Pain or swelling increases  · Fingers or hand becomes cold, blue, numb, or tingly  Date Last Reviewed: 11/20/2015  © 5487-2138 [employer]. [address] [place] [address], [place] [address]. All rights reserved. This information is not intended as a substitute for professional  medical care. Always follow your healthcare professional's instructions.      Please follow up with your Primary care provider within 2-5 days if your signs and symptoms have not resolved or worsen.     If your condition worsens or fails to improve we recommend that you receive another evaluation at the emergency room immediately or contact your primary medical clinic to discuss your concerns.   You must understand that you have received an Urgent Care treatment only and that you may be released before all of your medical problems are known or treated. You, the patient, will arrange for follow up care as instructed.     RED FLAGS/WARNING SYMPTOMS DISCUSSED WITH PATIENT THAT WOULD WARRANT EMERGENT MEDICAL ATTENTION. PATIENT VERBALIZED UNDERSTANDING.

## 2020-06-02 ENCOUNTER — TELEPHONE (OUTPATIENT)
Dept: URGENT CARE | Facility: CLINIC | Age: 30
End: 2020-06-02

## 2020-10-05 ENCOUNTER — PATIENT MESSAGE (OUTPATIENT)
Dept: ADMINISTRATIVE | Facility: HOSPITAL | Age: 30
End: 2020-10-05

## 2021-01-05 ENCOUNTER — PATIENT MESSAGE (OUTPATIENT)
Dept: ADMINISTRATIVE | Facility: HOSPITAL | Age: 31
End: 2021-01-05

## 2021-04-05 ENCOUNTER — PATIENT MESSAGE (OUTPATIENT)
Dept: ADMINISTRATIVE | Facility: HOSPITAL | Age: 31
End: 2021-04-05

## 2021-04-16 ENCOUNTER — PATIENT MESSAGE (OUTPATIENT)
Dept: RESEARCH | Facility: HOSPITAL | Age: 31
End: 2021-04-16

## 2021-09-30 ENCOUNTER — OFFICE VISIT (OUTPATIENT)
Dept: FAMILY MEDICINE | Facility: CLINIC | Age: 31
End: 2021-09-30
Payer: COMMERCIAL

## 2021-09-30 VITALS
SYSTOLIC BLOOD PRESSURE: 122 MMHG | HEIGHT: 68 IN | WEIGHT: 191.38 LBS | BODY MASS INDEX: 29.01 KG/M2 | OXYGEN SATURATION: 97 % | HEART RATE: 66 BPM | DIASTOLIC BLOOD PRESSURE: 74 MMHG

## 2021-09-30 DIAGNOSIS — N48.89 PENILE PAIN: Primary | ICD-10-CM

## 2021-09-30 DIAGNOSIS — Z00.00 ANNUAL PHYSICAL EXAM: ICD-10-CM

## 2021-09-30 DIAGNOSIS — G89.29 CHRONIC PAIN OF LEFT KNEE: ICD-10-CM

## 2021-09-30 DIAGNOSIS — M25.562 CHRONIC PAIN OF LEFT KNEE: ICD-10-CM

## 2021-09-30 PROCEDURE — 3078F DIAST BP <80 MM HG: CPT | Mod: CPTII,S$GLB,, | Performed by: INTERNAL MEDICINE

## 2021-09-30 PROCEDURE — 99385 PR PREVENTIVE VISIT,NEW,18-39: ICD-10-PCS | Mod: S$GLB,,, | Performed by: INTERNAL MEDICINE

## 2021-09-30 PROCEDURE — 3008F PR BODY MASS INDEX (BMI) DOCUMENTED: ICD-10-PCS | Mod: CPTII,S$GLB,, | Performed by: INTERNAL MEDICINE

## 2021-09-30 PROCEDURE — 1159F MED LIST DOCD IN RCRD: CPT | Mod: CPTII,S$GLB,, | Performed by: INTERNAL MEDICINE

## 2021-09-30 PROCEDURE — 99999 PR PBB SHADOW E&M-EST. PATIENT-LVL IV: ICD-10-PCS | Mod: PBBFAC,,, | Performed by: INTERNAL MEDICINE

## 2021-09-30 PROCEDURE — 3078F PR MOST RECENT DIASTOLIC BLOOD PRESSURE < 80 MM HG: ICD-10-PCS | Mod: CPTII,S$GLB,, | Performed by: INTERNAL MEDICINE

## 2021-09-30 PROCEDURE — 99999 PR PBB SHADOW E&M-EST. PATIENT-LVL IV: CPT | Mod: PBBFAC,,, | Performed by: INTERNAL MEDICINE

## 2021-09-30 PROCEDURE — 1160F PR REVIEW ALL MEDS BY PRESCRIBER/CLIN PHARMACIST DOCUMENTED: ICD-10-PCS | Mod: CPTII,S$GLB,, | Performed by: INTERNAL MEDICINE

## 2021-09-30 PROCEDURE — 1160F RVW MEDS BY RX/DR IN RCRD: CPT | Mod: CPTII,S$GLB,, | Performed by: INTERNAL MEDICINE

## 2021-09-30 PROCEDURE — 99385 PREV VISIT NEW AGE 18-39: CPT | Mod: S$GLB,,, | Performed by: INTERNAL MEDICINE

## 2021-09-30 PROCEDURE — 3074F SYST BP LT 130 MM HG: CPT | Mod: CPTII,S$GLB,, | Performed by: INTERNAL MEDICINE

## 2021-09-30 PROCEDURE — 3008F BODY MASS INDEX DOCD: CPT | Mod: CPTII,S$GLB,, | Performed by: INTERNAL MEDICINE

## 2021-09-30 PROCEDURE — 3074F PR MOST RECENT SYSTOLIC BLOOD PRESSURE < 130 MM HG: ICD-10-PCS | Mod: CPTII,S$GLB,, | Performed by: INTERNAL MEDICINE

## 2021-09-30 PROCEDURE — 1159F PR MEDICATION LIST DOCUMENTED IN MEDICAL RECORD: ICD-10-PCS | Mod: CPTII,S$GLB,, | Performed by: INTERNAL MEDICINE

## 2021-09-30 RX ORDER — MELOXICAM 7.5 MG/1
7.5 TABLET ORAL DAILY
Qty: 30 TABLET | Refills: 0 | Status: SHIPPED | OUTPATIENT
Start: 2021-09-30 | End: 2023-01-14

## 2021-09-30 RX ORDER — TIZANIDINE 2 MG/1
2 TABLET ORAL NIGHTLY PRN
Qty: 20 TABLET | Refills: 1 | Status: SHIPPED | OUTPATIENT
Start: 2021-09-30 | End: 2021-10-10

## 2021-10-01 ENCOUNTER — TELEPHONE (OUTPATIENT)
Dept: FAMILY MEDICINE | Facility: CLINIC | Age: 31
End: 2021-10-01

## 2022-08-16 ENCOUNTER — NURSE TRIAGE (OUTPATIENT)
Dept: ADMINISTRATIVE | Facility: CLINIC | Age: 32
End: 2022-08-16
Payer: COMMERCIAL

## 2022-08-16 NOTE — TELEPHONE ENCOUNTER
Mr. Hamilton is calling to report that he tested positive for COVID at work this am and he is feeling very poorly.  He reports a severe cough and sore throat, feeling like his equilibrium is off causing him difficulty with balance when walking,  Sweating and feeling faint/weak.  I advised he call 911 now, but he states he can drive himself.  I reiterated that he should not be driving, should call an ambulance to bring him to the ED.  He verbalized understanding.  Reason for Disposition   Shock suspected (e.g., cold/pale/clammy skin, too weak to stand, low BP, rapid pulse)    Additional Information   Negative: SEVERE difficulty breathing (e.g., struggling for each breath, speaks in single words)   Negative: Difficult to awaken or acting confused (e.g., disoriented, slurred speech)   Negative: Bluish (or gray) lips or face now    Protocols used: CORONAVIRUS (COVID-19) DIAGNOSED OR [id number]

## 2023-01-14 ENCOUNTER — ANESTHESIA (OUTPATIENT)
Dept: SURGERY | Facility: HOSPITAL | Age: 33
End: 2023-01-14
Payer: COMMERCIAL

## 2023-01-14 ENCOUNTER — HOSPITAL ENCOUNTER (OUTPATIENT)
Facility: HOSPITAL | Age: 33
Discharge: HOME | End: 2023-01-14
Attending: STUDENT IN AN ORGANIZED HEALTH CARE EDUCATION/TRAINING PROGRAM | Admitting: SURGERY
Payer: COMMERCIAL

## 2023-01-14 ENCOUNTER — ANESTHESIA EVENT (OUTPATIENT)
Dept: SURGERY | Facility: HOSPITAL | Age: 33
End: 2023-01-14
Payer: COMMERCIAL

## 2023-01-14 VITALS
SYSTOLIC BLOOD PRESSURE: 122 MMHG | BODY MASS INDEX: 29.01 KG/M2 | HEIGHT: 68 IN | WEIGHT: 191.38 LBS | TEMPERATURE: 98 F | DIASTOLIC BLOOD PRESSURE: 81 MMHG | HEART RATE: 67 BPM | OXYGEN SATURATION: 100 % | RESPIRATION RATE: 18 BRPM

## 2023-01-14 DIAGNOSIS — K35.30 ACUTE APPENDICITIS WITH LOCALIZED PERITONITIS, WITHOUT PERFORATION, ABSCESS, OR GANGRENE: ICD-10-CM

## 2023-01-14 DIAGNOSIS — R10.9 ABDOMINAL PAIN, UNSPECIFIED ABDOMINAL LOCATION: ICD-10-CM

## 2023-01-14 DIAGNOSIS — R10.31 RIGHT LOWER QUADRANT ABDOMINAL PAIN: ICD-10-CM

## 2023-01-14 DIAGNOSIS — R10.9 ABDOMINAL PAIN, UNSPECIFIED ABDOMINAL LOCATION: Primary | ICD-10-CM

## 2023-01-14 PROBLEM — K35.80 ACUTE APPENDICITIS: Status: ACTIVE | Noted: 2023-01-14

## 2023-01-14 LAB
ALBUMIN SERPL BCP-MCNC: 4 G/DL (ref 3.5–5.2)
ALP SERPL-CCNC: 47 U/L (ref 55–135)
ALT SERPL W/O P-5'-P-CCNC: 28 U/L (ref 10–44)
ANION GAP SERPL CALC-SCNC: 9 MMOL/L (ref 8–16)
AST SERPL-CCNC: 21 U/L (ref 10–40)
BASOPHILS # BLD AUTO: 0.02 K/UL (ref 0–0.2)
BASOPHILS NFR BLD: 0.4 % (ref 0–1.9)
BILIRUB SERPL-MCNC: 0.9 MG/DL (ref 0.1–1)
BILIRUB UR QL STRIP: NEGATIVE
BUN SERPL-MCNC: 22 MG/DL (ref 6–20)
CALCIUM SERPL-MCNC: 9.6 MG/DL (ref 8.7–10.5)
CHLORIDE SERPL-SCNC: 106 MMOL/L (ref 95–110)
CLARITY UR: CLEAR
CO2 SERPL-SCNC: 24 MMOL/L (ref 23–29)
COLOR UR: COLORLESS
CREAT SERPL-MCNC: 1.1 MG/DL (ref 0.5–1.4)
DIFFERENTIAL METHOD: ABNORMAL
EOSINOPHIL # BLD AUTO: 0.1 K/UL (ref 0–0.5)
EOSINOPHIL NFR BLD: 2.4 % (ref 0–8)
ERYTHROCYTE [DISTWIDTH] IN BLOOD BY AUTOMATED COUNT: 12.4 % (ref 11.5–14.5)
EST. GFR  (NO RACE VARIABLE): >60 ML/MIN/1.73 M^2
GLUCOSE SERPL-MCNC: 161 MG/DL (ref 70–110)
GLUCOSE UR QL STRIP: NEGATIVE
HCT VFR BLD AUTO: 43.1 % (ref 40–54)
HGB BLD-MCNC: 15 G/DL (ref 14–18)
HGB UR QL STRIP: NEGATIVE
IMM GRANULOCYTES # BLD AUTO: 0.02 K/UL (ref 0–0.04)
IMM GRANULOCYTES NFR BLD AUTO: 0.4 % (ref 0–0.5)
KETONES UR QL STRIP: NEGATIVE
LEUKOCYTE ESTERASE UR QL STRIP: NEGATIVE
LIPASE SERPL-CCNC: 18 U/L (ref 4–60)
LYMPHOCYTES # BLD AUTO: 0.8 K/UL (ref 1–4.8)
LYMPHOCYTES NFR BLD: 14.6 % (ref 18–48)
MCH RBC QN AUTO: 31.9 PG (ref 27–31)
MCHC RBC AUTO-ENTMCNC: 34.8 G/DL (ref 32–36)
MCV RBC AUTO: 92 FL (ref 82–98)
MONOCYTES # BLD AUTO: 0.5 K/UL (ref 0.3–1)
MONOCYTES NFR BLD: 9.9 % (ref 4–15)
NEUTROPHILS # BLD AUTO: 4 K/UL (ref 1.8–7.7)
NEUTROPHILS NFR BLD: 72.3 % (ref 38–73)
NITRITE UR QL STRIP: NEGATIVE
NRBC BLD-RTO: 0 /100 WBC
PH UR STRIP: 6 [PH] (ref 5–8)
PLATELET # BLD AUTO: 224 K/UL (ref 150–450)
PMV BLD AUTO: 9.8 FL (ref 9.2–12.9)
POTASSIUM SERPL-SCNC: 4 MMOL/L (ref 3.5–5.1)
PROT SERPL-MCNC: 7.6 G/DL (ref 6–8.4)
PROT UR QL STRIP: ABNORMAL
RBC # BLD AUTO: 4.7 M/UL (ref 4.6–6.2)
SODIUM SERPL-SCNC: 139 MMOL/L (ref 136–145)
SP GR UR STRIP: >1.03 (ref 1–1.03)
URN SPEC COLLECT METH UR: ABNORMAL
UROBILINOGEN UR STRIP-ACNC: NEGATIVE EU/DL
WBC # BLD AUTO: 5.48 K/UL (ref 3.9–12.7)

## 2023-01-14 PROCEDURE — 27201423 OPTIME MED/SURG SUP & DEVICES STERILE SUPPLY: Performed by: SURGERY

## 2023-01-14 PROCEDURE — 36000709 HC OR TIME LEV III EA ADD 15 MIN: Performed by: SURGERY

## 2023-01-14 PROCEDURE — 44970 PR LAP,APPENDECTOMY: ICD-10-PCS | Mod: ,,, | Performed by: SURGERY

## 2023-01-14 PROCEDURE — 37000009 HC ANESTHESIA EA ADD 15 MINS: Performed by: SURGERY

## 2023-01-14 PROCEDURE — 85025 COMPLETE CBC W/AUTO DIFF WBC: CPT | Performed by: STUDENT IN AN ORGANIZED HEALTH CARE EDUCATION/TRAINING PROGRAM

## 2023-01-14 PROCEDURE — 36000708 HC OR TIME LEV III 1ST 15 MIN: Performed by: SURGERY

## 2023-01-14 PROCEDURE — 96365 THER/PROPH/DIAG IV INF INIT: CPT | Mod: 59

## 2023-01-14 PROCEDURE — 81003 URINALYSIS AUTO W/O SCOPE: CPT | Performed by: STUDENT IN AN ORGANIZED HEALTH CARE EDUCATION/TRAINING PROGRAM

## 2023-01-14 PROCEDURE — 25000003 PHARM REV CODE 250: Performed by: NURSE ANESTHETIST, CERTIFIED REGISTERED

## 2023-01-14 PROCEDURE — D9220A PRA ANESTHESIA: ICD-10-PCS | Mod: CRNA,,, | Performed by: NURSE ANESTHETIST, CERTIFIED REGISTERED

## 2023-01-14 PROCEDURE — 25500020 PHARM REV CODE 255: Performed by: STUDENT IN AN ORGANIZED HEALTH CARE EDUCATION/TRAINING PROGRAM

## 2023-01-14 PROCEDURE — 37000008 HC ANESTHESIA 1ST 15 MINUTES: Performed by: SURGERY

## 2023-01-14 PROCEDURE — 71000033 HC RECOVERY, INTIAL HOUR: Performed by: SURGERY

## 2023-01-14 PROCEDURE — G0378 HOSPITAL OBSERVATION PER HR: HCPCS

## 2023-01-14 PROCEDURE — 25000003 PHARM REV CODE 250: Performed by: STUDENT IN AN ORGANIZED HEALTH CARE EDUCATION/TRAINING PROGRAM

## 2023-01-14 PROCEDURE — 63600175 PHARM REV CODE 636 W HCPCS: Performed by: SURGERY

## 2023-01-14 PROCEDURE — 25000003 PHARM REV CODE 250: Performed by: SURGERY

## 2023-01-14 PROCEDURE — 88304 TISSUE EXAM BY PATHOLOGIST: CPT | Mod: 26,,, | Performed by: PATHOLOGY

## 2023-01-14 PROCEDURE — 88304 PR  SURG PATH,LEVEL III: ICD-10-PCS | Mod: 26,,, | Performed by: PATHOLOGY

## 2023-01-14 PROCEDURE — D9220A PRA ANESTHESIA: Mod: CRNA,,, | Performed by: NURSE ANESTHETIST, CERTIFIED REGISTERED

## 2023-01-14 PROCEDURE — D9220A PRA ANESTHESIA: Mod: ANES,,, | Performed by: STUDENT IN AN ORGANIZED HEALTH CARE EDUCATION/TRAINING PROGRAM

## 2023-01-14 PROCEDURE — 96375 TX/PRO/DX INJ NEW DRUG ADDON: CPT

## 2023-01-14 PROCEDURE — 63600175 PHARM REV CODE 636 W HCPCS: Performed by: STUDENT IN AN ORGANIZED HEALTH CARE EDUCATION/TRAINING PROGRAM

## 2023-01-14 PROCEDURE — 44970 LAPAROSCOPY APPENDECTOMY: CPT | Mod: ,,, | Performed by: SURGERY

## 2023-01-14 PROCEDURE — D9220A PRA ANESTHESIA: ICD-10-PCS | Mod: ANES,,, | Performed by: STUDENT IN AN ORGANIZED HEALTH CARE EDUCATION/TRAINING PROGRAM

## 2023-01-14 PROCEDURE — 99214 OFFICE O/P EST MOD 30 MIN: CPT | Mod: 57,,, | Performed by: SURGERY

## 2023-01-14 PROCEDURE — 80053 COMPREHEN METABOLIC PANEL: CPT | Performed by: STUDENT IN AN ORGANIZED HEALTH CARE EDUCATION/TRAINING PROGRAM

## 2023-01-14 PROCEDURE — 96361 HYDRATE IV INFUSION ADD-ON: CPT

## 2023-01-14 PROCEDURE — 88304 TISSUE EXAM BY PATHOLOGIST: CPT | Performed by: PATHOLOGY

## 2023-01-14 PROCEDURE — 63600175 PHARM REV CODE 636 W HCPCS: Performed by: NURSE ANESTHETIST, CERTIFIED REGISTERED

## 2023-01-14 PROCEDURE — 83690 ASSAY OF LIPASE: CPT | Performed by: STUDENT IN AN ORGANIZED HEALTH CARE EDUCATION/TRAINING PROGRAM

## 2023-01-14 PROCEDURE — 99214 PR OFFICE/OUTPT VISIT, EST, LEVL IV, 30-39 MIN: ICD-10-PCS | Mod: 57,,, | Performed by: SURGERY

## 2023-01-14 PROCEDURE — 99285 EMERGENCY DEPT VISIT HI MDM: CPT | Mod: 25

## 2023-01-14 RX ORDER — PROCHLORPERAZINE EDISYLATE 5 MG/ML
5 INJECTION INTRAMUSCULAR; INTRAVENOUS EVERY 30 MIN PRN
Status: DISCONTINUED | OUTPATIENT
Start: 2023-01-14 | End: 2023-01-14 | Stop reason: HOSPADM

## 2023-01-14 RX ORDER — ONDANSETRON 2 MG/ML
INJECTION INTRAMUSCULAR; INTRAVENOUS
Status: DISCONTINUED | OUTPATIENT
Start: 2023-01-14 | End: 2023-01-14

## 2023-01-14 RX ORDER — FENTANYL CITRATE 50 UG/ML
INJECTION, SOLUTION INTRAMUSCULAR; INTRAVENOUS
Status: DISCONTINUED | OUTPATIENT
Start: 2023-01-14 | End: 2023-01-14

## 2023-01-14 RX ORDER — ONDANSETRON 2 MG/ML
4 INJECTION INTRAMUSCULAR; INTRAVENOUS DAILY PRN
Status: DISCONTINUED | OUTPATIENT
Start: 2023-01-14 | End: 2023-01-14 | Stop reason: HOSPADM

## 2023-01-14 RX ORDER — LIDOCAINE HCL/PF 100 MG/5ML
SYRINGE (ML) INTRAVENOUS
Status: DISCONTINUED | OUTPATIENT
Start: 2023-01-14 | End: 2023-01-14

## 2023-01-14 RX ORDER — DEXAMETHASONE SODIUM PHOSPHATE 4 MG/ML
INJECTION, SOLUTION INTRA-ARTICULAR; INTRALESIONAL; INTRAMUSCULAR; INTRAVENOUS; SOFT TISSUE
Status: DISCONTINUED | OUTPATIENT
Start: 2023-01-14 | End: 2023-01-14

## 2023-01-14 RX ORDER — BUPIVACAINE HYDROCHLORIDE 5 MG/ML
INJECTION, SOLUTION PERINEURAL
Status: DISCONTINUED | OUTPATIENT
Start: 2023-01-14 | End: 2023-01-14 | Stop reason: HOSPADM

## 2023-01-14 RX ORDER — ROCURONIUM BROMIDE 10 MG/ML
INJECTION, SOLUTION INTRAVENOUS
Status: DISCONTINUED | OUTPATIENT
Start: 2023-01-14 | End: 2023-01-14

## 2023-01-14 RX ORDER — MIDAZOLAM HYDROCHLORIDE 1 MG/ML
INJECTION INTRAMUSCULAR; INTRAVENOUS
Status: DISCONTINUED | OUTPATIENT
Start: 2023-01-14 | End: 2023-01-14

## 2023-01-14 RX ORDER — PROPOFOL 10 MG/ML
VIAL (ML) INTRAVENOUS
Status: DISCONTINUED | OUTPATIENT
Start: 2023-01-14 | End: 2023-01-14

## 2023-01-14 RX ORDER — OSELTAMIVIR PHOSPHATE 75 MG/1
75 CAPSULE ORAL 2 TIMES DAILY
COMMUNITY
Start: 2022-11-28 | End: 2023-01-14 | Stop reason: ALTCHOICE

## 2023-01-14 RX ORDER — HYDROMORPHONE HYDROCHLORIDE 2 MG/ML
0.5 INJECTION, SOLUTION INTRAMUSCULAR; INTRAVENOUS; SUBCUTANEOUS EVERY 5 MIN PRN
Status: DISCONTINUED | OUTPATIENT
Start: 2023-01-14 | End: 2023-01-14 | Stop reason: HOSPADM

## 2023-01-14 RX ORDER — SODIUM CHLORIDE 0.9 % (FLUSH) 0.9 %
10 SYRINGE (ML) INJECTION
Status: DISCONTINUED | OUTPATIENT
Start: 2023-01-14 | End: 2023-01-14 | Stop reason: HOSPADM

## 2023-01-14 RX ORDER — LIDOCAINE HYDROCHLORIDE 10 MG/ML
INJECTION INFILTRATION; PERINEURAL
Status: DISCONTINUED | OUTPATIENT
Start: 2023-01-14 | End: 2023-01-14 | Stop reason: HOSPADM

## 2023-01-14 RX ORDER — SUCCINYLCHOLINE CHLORIDE 20 MG/ML
INJECTION INTRAMUSCULAR; INTRAVENOUS
Status: DISCONTINUED | OUTPATIENT
Start: 2023-01-14 | End: 2023-01-14

## 2023-01-14 RX ORDER — ONDANSETRON 2 MG/ML
4 INJECTION INTRAMUSCULAR; INTRAVENOUS
Status: COMPLETED | OUTPATIENT
Start: 2023-01-14 | End: 2023-01-14

## 2023-01-14 RX ORDER — KETOROLAC TROMETHAMINE 10 MG/1
10 TABLET, FILM COATED ORAL EVERY 6 HOURS PRN
Qty: 12 TABLET | Refills: 0 | Status: SHIPPED | OUTPATIENT
Start: 2023-01-14 | End: 2023-01-17

## 2023-01-14 RX ORDER — OXYCODONE AND ACETAMINOPHEN 5; 325 MG/1; MG/1
1 TABLET ORAL EVERY 6 HOURS PRN
Qty: 12 TABLET | Refills: 0 | Status: SHIPPED | OUTPATIENT
Start: 2023-01-14 | End: 2023-01-21

## 2023-01-14 RX ORDER — MORPHINE SULFATE 4 MG/ML
4 INJECTION, SOLUTION INTRAMUSCULAR; INTRAVENOUS
Status: COMPLETED | OUTPATIENT
Start: 2023-01-14 | End: 2023-01-14

## 2023-01-14 RX ORDER — SODIUM CHLORIDE, SODIUM LACTATE, POTASSIUM CHLORIDE, CALCIUM CHLORIDE 600; 310; 30; 20 MG/100ML; MG/100ML; MG/100ML; MG/100ML
INJECTION, SOLUTION INTRAVENOUS CONTINUOUS PRN
Status: DISCONTINUED | OUTPATIENT
Start: 2023-01-14 | End: 2023-01-14

## 2023-01-14 RX ORDER — OXYCODONE HYDROCHLORIDE 5 MG/1
5 TABLET ORAL
Status: DISCONTINUED | OUTPATIENT
Start: 2023-01-14 | End: 2023-01-14 | Stop reason: HOSPADM

## 2023-01-14 RX ORDER — OXYCODONE AND ACETAMINOPHEN 5; 325 MG/1; MG/1
1 TABLET ORAL EVERY 4 HOURS PRN
Status: DISCONTINUED | OUTPATIENT
Start: 2023-01-14 | End: 2023-01-14 | Stop reason: HOSPADM

## 2023-01-14 RX ORDER — SODIUM CHLORIDE, SODIUM LACTATE, POTASSIUM CHLORIDE, CALCIUM CHLORIDE 600; 310; 30; 20 MG/100ML; MG/100ML; MG/100ML; MG/100ML
INJECTION, SOLUTION INTRAVENOUS CONTINUOUS
Status: DISCONTINUED | OUTPATIENT
Start: 2023-01-14 | End: 2023-01-14

## 2023-01-14 RX ADMIN — MIDAZOLAM HYDROCHLORIDE 2 MG: 1 INJECTION, SOLUTION INTRAMUSCULAR; INTRAVENOUS at 02:01

## 2023-01-14 RX ADMIN — ONDANSETRON HYDROCHLORIDE 4 MG: 2 SOLUTION INTRAMUSCULAR; INTRAVENOUS at 09:01

## 2023-01-14 RX ADMIN — MORPHINE SULFATE 4 MG: 4 INJECTION INTRAVENOUS at 09:01

## 2023-01-14 RX ADMIN — SUGAMMADEX 200 MG: 100 INJECTION, SOLUTION INTRAVENOUS at 03:01

## 2023-01-14 RX ADMIN — IOHEXOL 100 ML: 350 INJECTION, SOLUTION INTRAVENOUS at 09:01

## 2023-01-14 RX ADMIN — SODIUM CHLORIDE, SODIUM LACTATE, POTASSIUM CHLORIDE, AND CALCIUM CHLORIDE: .6; .31; .03; .02 INJECTION, SOLUTION INTRAVENOUS at 02:01

## 2023-01-14 RX ADMIN — OXYCODONE HYDROCHLORIDE 5 MG: 5 TABLET ORAL at 04:01

## 2023-01-14 RX ADMIN — FENTANYL CITRATE 100 MCG: 0.05 INJECTION, SOLUTION INTRAMUSCULAR; INTRAVENOUS at 02:01

## 2023-01-14 RX ADMIN — ROCURONIUM BROMIDE 5 MG: 10 INJECTION, SOLUTION INTRAVENOUS at 02:01

## 2023-01-14 RX ADMIN — ROCURONIUM BROMIDE 25 MG: 10 INJECTION, SOLUTION INTRAVENOUS at 02:01

## 2023-01-14 RX ADMIN — PIPERACILLIN AND TAZOBACTAM 4.5 G: 4; .5 INJECTION, POWDER, LYOPHILIZED, FOR SOLUTION INTRAVENOUS; PARENTERAL at 12:01

## 2023-01-14 RX ADMIN — SODIUM CHLORIDE, SODIUM LACTATE, POTASSIUM CHLORIDE, AND CALCIUM CHLORIDE: .6; .31; .03; .02 INJECTION, SOLUTION INTRAVENOUS at 11:01

## 2023-01-14 RX ADMIN — SODIUM CHLORIDE 1000 ML: 0.9 INJECTION, SOLUTION INTRAVENOUS at 09:01

## 2023-01-14 RX ADMIN — ONDANSETRON 4 MG: 2 INJECTION, SOLUTION INTRAMUSCULAR; INTRAVENOUS at 02:01

## 2023-01-14 RX ADMIN — DEXAMETHASONE SODIUM PHOSPHATE 4 MG: 4 INJECTION, SOLUTION INTRA-ARTICULAR; INTRALESIONAL; INTRAMUSCULAR; INTRAVENOUS; SOFT TISSUE at 02:01

## 2023-01-14 RX ADMIN — LIDOCAINE HYDROCHLORIDE 100 MG: 20 INJECTION, SOLUTION INTRAVENOUS at 02:01

## 2023-01-14 RX ADMIN — SUCCINYLCHOLINE CHLORIDE 140 MG: 20 INJECTION, SOLUTION INTRAMUSCULAR; INTRAVENOUS at 02:01

## 2023-01-14 RX ADMIN — PROPOFOL 200 MG: 10 INJECTION, EMULSION INTRAVENOUS at 02:01

## 2023-01-14 NOTE — ANESTHESIA POSTPROCEDURE EVALUATION
Anesthesia Post Evaluation    Patient: Du Hamilton    Procedure(s) Performed: Procedure(s) (LRB):  APPENDECTOMY, LAPAROSCOPIC (N/A)    Final Anesthesia Type: general      Patient location during evaluation: med/surg floor  Patient participation: Yes- Able to Participate  Level of consciousness: awake and alert  Post-procedure vital signs: reviewed and stable  Pain management: adequate  Airway patency: patent    PONV status at discharge: No PONV  Anesthetic complications: no      Cardiovascular status: blood pressure returned to baseline and hemodynamically stable  Respiratory status: unassisted, spontaneous ventilation and room air  Hydration status: euvolemic  Follow-up not needed.          Vitals Value Taken Time   /71 01/14/23 1611   Temp 36.6 °C (97.9 °F) 01/14/23 1610   Pulse 48 01/14/23 1610   Resp 20 01/14/23 1610   SpO2 100 % 01/14/23 1610   Vitals shown include unvalidated device data.      No case tracking events are documented in the log.      Pain/Rasheed Score: Pain Rating Prior to Med Admin: 4 (1/14/2023  4:06 PM)  Pain Rating Post Med Admin: 0 (1/14/2023  3:25 PM)  Rasheed Score: 8 (1/14/2023  3:25 PM)

## 2023-01-14 NOTE — ED NOTES
Present to ED with RLQ abd pain for 3 days. Denies taking on OTC meds. Denies n/v/d or dysuria. + right lower back pain. Denies chest pain or SOB.     APPEARANCE: Alert, oriented and in no acute distress.  CARDIAC: Normal rate and rhythm, no murmur heard.   RESPIRATORY:Normal rate and effort, breath sounds clear bilaterally throughout chest. Respirations are equal and unlabored no obvious signs of distress.  GASTRO: soft, bowel sounds normal, no tenderness, no abdominal distention. +RLQ abd pain  NEURO: 5/5 strength major flexors/extensors bilaterally. Sensory intact to light touch bilaterally. Jeannie coma scale: eyes open spontaneously-4, oriented & converses-5, obeys commands-6. No neurological abnormalities.   MENTAL STATUS: awake, alert and aware of environment.

## 2023-01-14 NOTE — PHARMACY MED REC
"  [facility]           Pharmacy  Admission Medication History     The home medication history was taken by Rashida Yung.      Medication history obtained from Medications listed below were obtained from: Patient/family. Patient verified he is only taking a vitamin    Based on information gathered for medication list, you may go to "Admission" then "Reconcile Home Medications" tabs to review and/or act upon those items.     The home medication list has been updated by the Pharmacy department.   Please read ALL comments highlighted in yellow.   Please address this information as you see fit.    Feel free to contact us if you have any questions or require assistance.    The medications listed below were removed from the home medication list.  Please reorder if appropriate:    Patient reports NOT TAKING the following medication(s):  Tamiflu 75mg  Chantix ariadne 0.5mg-1mg  Mobic 7.5mg    No current facility-administered medications on file prior to encounter.     Current Outpatient Medications on File Prior to Encounter   Medication Sig Dispense Refill    multivitamin capsule Take 1 capsule by mouth once daily.         Please address this information as you see fit.  Feel free to contact us if you have any questions or require assistance.    Rashida Yung  [phone]                .        "
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
## 2023-01-14 NOTE — NURSING
Patient discharged from PACU s/p lap appendectomy. Patient alert and awake. Vitals taken. Lap sites covered with band-aid, intact with scanty drainage. Oriented to room set up. Call light wihtin reach. Bed alarm on.

## 2023-01-14 NOTE — OP NOTE
PATIENT: Du Hamilton    MRN: [id number]    Date of Procedure:  01/14/2023    Surgeon: Jon Stevenson M.D.    Assistant: none    Procedure: Laparoscopic Appendectomy    Pre-Operative Diagnosis: Right lower quadrant pain, appendix borderline enlarged on imaging    Post-Operative Diagnosis: Minimal appendiceal inflammation    Anesthesia: General    Specimen: Appendix    Complications: none    Estimated Blood Loss (EBL): minimal    INDICATIONS: The patient is a 32 y.o. male  who presents to the emergency room with non-classic symptoms for appendicitis and appendix noted to be at upper limit of normal size with inflammation on CT. The patient was given options to discharge with close follow-up, admission for observation of symptoms, or diagnostic laparoscopy with appendectomy.  Pt elected for later although I told him I felt it was low chance he had appendicitis.  Pt was counseled on their options for treatment and they expressed the desire to proceed with surgical intervention. The risks of the procedure were described to the patient including bleeding, infection, pain, scarring, wound complications including hernias, injury to local structures, potential need for further surgery, perioperative cardiac/pulmonary/thromboembolic events, and possible death.    PROCEDURE:  After surgical consent was obtained, the patient was transported to the operative theater and onto the operating room table in the supine position.  General endotracheal anesthesia was administered without difficulty.  Perioperative antibiotics were given and a time-out was performed in order to ensure the proper patient and procedure.  The patient's abdomen was prepped and draped in a standard sterile fashion.  An incision was made in the skin around the umbilicus and carried down bluntly to the fascia.  The fascia was sharply divided and the abdominal cavity was entered bluntly.  A trocar was inserted without difficulty and the abdomen was insufflated.   Two additional trocars were inserted under direct visualization and without issue. The patient was placed in the Trendelenburg position with the left side down.  The appendix was identified and there was minimal inflammation at the mid portion. The base of the appendix was identified and a window was made in the mesoappendix.  The appendiceal base was then stapled and divided using at Endo stapler with a tissue load.  The mesoappendix was similarly divided with a ligasure device  The appendix was placed in a specimen bag and removed from the field.  The right lower quadrant was irrigated out with sterile saline.  The staple line was intact.  We desufflated the abdomen under direct visualization and removed all trocars.  The fascial defect at the periumbilical incision was closed using a figure-of-eight 0 Vicryl suture.  The incisions were irrigated out with sterile saline and reinjected with local anesthetic.  The skin openings were closed using interrupted Monocryl stitches and covered with a sterile Band-Aid.  At the end of the procedure the instrument, lap, and needle counts were correct.  The patient was awoken from anesthesia having tolerated the procedure without difficulty and was returned to the PACU in a stable condition.  Any available family was updated and all questions were answered.    Jon Stevenson M.D., F.A.C.S.  [facility] and General Surgery  [facility] [place]

## 2023-01-14 NOTE — ANESTHESIA RELEASE NOTE
"Anesthesia Release from PACU Note    Patient: Du Hamilton    Procedure(s) Performed: Procedure(s) (LRB):  APPENDECTOMY, LAPAROSCOPIC (N/A)    Anesthesia type: general    Post pain: Adequate analgesia    Post assessment: no apparent anesthetic complications, tolerated procedure well and no evidence of recall    Last Vitals:   Visit Vitals  /71   Pulse 62   Temp 36.5 °C (97.7 °F) (Skin)   Resp 16   Ht 5' 8" (1.727 m)   Wt 86.2 kg (190 lb)   SpO2 100%   BMI 28.89 kg/m²       Post vital signs: stable    Level of consciousness: awake    Nausea/Vomiting: no nausea/no vomiting    Complications: none    Airway Patency: patent    Respiratory: unassisted, spontaneous ventilation, room air    Cardiovascular: stable and blood pressure at baseline    Hydration: euvolemic  "
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
## 2023-01-14 NOTE — ANESTHESIA PREPROCEDURE EVALUATION
01/14/2023  Du Hamilton is a 32 y.o., male w abd pain for diagnostic laparoscopy.     Afebrile, no leukocytosis, HDS on RA.    Pre-op Assessment    I have reviewed the Patient Summary Reports.     I have reviewed the Nursing Notes. I have reviewed the NPO Status.      Review of Systems  Anesthesia Hx:   Denies Personal Hx of Anesthesia complications.   Hematology/Oncology:         -- Denies Anemia:   Cardiovascular:    Denies Angina.    Pulmonary:   Denies Shortness of breath.    Renal/:   Denies Chronic Renal Disease.     Neurological:  Neurology Normal    Psych:   depression          Physical Exam  General: Alert and Oriented    Airway:  Mallampati: II   Mouth Opening: Normal  TM Distance: Normal  Tongue: Normal  Neck ROM: Normal ROM    Dental:  Intact    Chest/Lungs:  Clear to auscultation, Normal Respiratory Rate    Heart:  Rate: Normal  Rhythm: Regular Rhythm        Anesthesia Plan  Type of Anesthesia, risks & benefits discussed:    Anesthesia Type: Gen ETT  Intra-op Monitoring Plan: Standard ASA Monitors  Post Op Pain Control Plan: multimodal analgesia and IV/PO Opioids PRN  Induction:  IV  Airway Plan: Direct, Post-Induction  Informed Consent: Informed consent signed with the Patient and all parties understand the risks and agree with anesthesia plan.  All questions answered.   ASA Score: 2    Ready For Surgery From Anesthesia Perspective.     .    Lab Results   Component Value Date    WBC 5.48 01/14/2023    HGB 15.0 01/14/2023    HCT 43.1 01/14/2023     01/14/2023    CHOL 161 05/25/2018    TRIG 128 05/25/2018    HDL 26 (L) 05/25/2018    ALT 28 01/14/2023    AST 21 01/14/2023     01/14/2023    K 4.0 01/14/2023     01/14/2023    CREATININE 1.1 01/14/2023    BUN 22 (H) 01/14/2023    CO2 24 01/14/2023    TSH 0.426 05/25/2018    HGBA1C 4.7 03/26/2018

## 2023-01-14 NOTE — PLAN OF CARE
Received in room 508 postop via stretcher,sr upx2 in care of Bahman Bianchi & Beata Rn-or. Assisted to bed,sr upx3. Recovery phase started.  
Recovery phase discharge criteria met, signed out per Dr Fuller. Report given to Gildardo Orr-5A.  
(4) no limitation

## 2023-01-14 NOTE — TRANSFER OF CARE
"Anesthesia Transfer of Care Note    Patient: Du Hamilton    Procedure(s) Performed: Procedure(s) (LRB):  APPENDECTOMY, LAPAROSCOPIC (N/A)    Patient location: Other: pt recovered on [facility] floor by PACU staff    Anesthesia Type: general    Transport from OR: Transported from OR on 6-10 L/min O2 by face mask with adequate spontaneous ventilation    Post pain: adequate analgesia    Post assessment: no apparent anesthetic complications and tolerated procedure well    Post vital signs: stable    Level of consciousness: awake and responds to stimulation    Nausea/Vomiting: no nausea/vomiting    Complications: none    Transfer of care protocol was followed      Last vitals:   Visit Vitals  /71   Pulse 62   Temp 36.5 °C (97.7 °F) (Skin)   Resp 16   Ht 5' 8" (1.727 m)   Wt 86.2 kg (190 lb)   SpO2 100%   BMI 28.89 kg/m²     "

## 2023-01-15 NOTE — DISCHARGE SUMMARY
[facility]  Discharge Summary  General Surgery      Admit Date: 1/14/2023    Discharge Date: 1/14/2023  6:10 PM    Attending Physician/Discharge Provider: Jon Stevenson M.D.    Reason for Admission: Abdominal pain    Procedures Performed: Procedure(s) (LRB):  APPENDECTOMY, LAPAROSCOPIC (N/A)    Hospital Course:  Patient is a 32-year old male who presented with intermittent RLQ pains and non-classic symptoms for acute appendicitis.  CT showed no inflammation and only appendix at upper limit of normal size.  We discussed DC with close observation vs admission for symptom monitoring vs diagnostic laparoscopy.  Pt had preference for diagnostic laparoscopy. Appendix was minimally inflamed outwardly and was removed.  Pt was discharged home after recovering from surgery.    Final Diagnoses:   Principal Problem: Acute appendicitis       Discharged Condition: Good    Disposition: Home or Self Care    Follow Up/Patient Instructions: Follow up with Dr. Stevenson in 2 weeks.    Medications:  Reconciled Home Medications:      Medication List        START taking these medications      ketorolac 10 mg tablet  Commonly known as: TORADOL  Take 1 tablet (10 mg total) by mouth every 6 (six) hours as needed.     oxyCODONE-acetaminophen 5-325 mg per tablet  Commonly known as: PERCOCET  Take 1 tablet by mouth every 6 (six) hours as needed for Pain.            CONTINUE taking these medications      multivitamin capsule  Take 1 capsule by mouth once daily.            Discharge Procedure Orders   Diet Adult Regular     Ice to affected area     Lifting restrictions   Order Comments: 20 lbs x 3 weeks     No dressing needed   Order Comments: Ok to shower in 24 hours, no bathing or swimming x 2 weeks         Activity: As tolerated.  Diet: reg  Wound Care: As above.      Jon Stevenson M.D., F.A.C.S.  [facility] General Surgery  [facility] [place]

## 2023-01-15 NOTE — NURSING
Patient with dc orders in. Patient able to tolerate regular food without nausea. Patient ambulated to bathroom to urinate 1x. Patient with steady gait. PIV removed. AVS printed and reviewed with patient. Patient wheeled down to lobby per wheelchair.

## 2023-01-20 ENCOUNTER — TELEPHONE (OUTPATIENT)
Dept: SURGERY | Facility: CLINIC | Age: 33
End: 2023-01-20
Payer: COMMERCIAL

## 2023-01-20 NOTE — TELEPHONE ENCOUNTER
----- Message from Damian Moraes sent at 1/20/2023  1:57 PM CST -----  .Type:  Needs Medical Advice    Who Called: pt    Would the patient rather a call back or a response via [facility]? Call back  Best Call Back Number: [phone]  Additional Information:      Pt states that he missed a call and needs a call back concerning some paperwork he need filled out          01/20/2023                     1412  Informed patient his paperwork is completed and he can pick it up from the [email or web address] desk. Also let him know someone would be there until 1630. Patient verbalized understanding.

## 2023-01-20 NOTE — TELEPHONE ENCOUNTER
----- Message from Leydi Farias sent at 1/20/2023  9:26 AM CST -----  Type:  Needs Medical Advice    Who Called: pt  Symptoms (please be specific): pt is requesting to get a return call from this office in regards to some return to work paperwork        Would the patient rather a call back or a response via [facility]? call  Best Call Back Number: [phone]  Additional Information:       01/20/2023                 1337  Attempted to contact patient regarding the above message. No answer. Could not leave message because voicemail is not set up.

## 2023-01-23 LAB
FINAL PATHOLOGIC DIAGNOSIS: NORMAL
GROSS: NORMAL
Lab: NORMAL

## 2023-01-27 ENCOUNTER — OFFICE VISIT (OUTPATIENT)
Dept: SURGERY | Facility: CLINIC | Age: 33
End: 2023-01-27
Payer: COMMERCIAL

## 2023-01-27 VITALS
HEART RATE: 70 BPM | WEIGHT: 192.38 LBS | DIASTOLIC BLOOD PRESSURE: 81 MMHG | HEIGHT: 68 IN | SYSTOLIC BLOOD PRESSURE: 126 MMHG | BODY MASS INDEX: 29.16 KG/M2

## 2023-01-27 DIAGNOSIS — K35.30 ACUTE APPENDICITIS WITH LOCALIZED PERITONITIS, WITHOUT PERFORATION, ABSCESS, OR GANGRENE: Primary | ICD-10-CM

## 2023-01-27 PROCEDURE — 3079F PR MOST RECENT DIASTOLIC BLOOD PRESSURE 80-89 MM HG: ICD-10-PCS | Mod: CPTII,S$GLB,, | Performed by: SURGERY

## 2023-01-27 PROCEDURE — 3074F SYST BP LT 130 MM HG: CPT | Mod: CPTII,S$GLB,, | Performed by: SURGERY

## 2023-01-27 PROCEDURE — 3008F PR BODY MASS INDEX (BMI) DOCUMENTED: ICD-10-PCS | Mod: CPTII,S$GLB,, | Performed by: SURGERY

## 2023-01-27 PROCEDURE — 1160F PR REVIEW ALL MEDS BY PRESCRIBER/CLIN PHARMACIST DOCUMENTED: ICD-10-PCS | Mod: CPTII,S$GLB,, | Performed by: SURGERY

## 2023-01-27 PROCEDURE — 3008F BODY MASS INDEX DOCD: CPT | Mod: CPTII,S$GLB,, | Performed by: SURGERY

## 2023-01-27 PROCEDURE — 99024 PR POST-OP FOLLOW-UP VISIT: ICD-10-PCS | Mod: S$GLB,,, | Performed by: SURGERY

## 2023-01-27 PROCEDURE — 99024 POSTOP FOLLOW-UP VISIT: CPT | Mod: S$GLB,,, | Performed by: SURGERY

## 2023-01-27 PROCEDURE — 3079F DIAST BP 80-89 MM HG: CPT | Mod: CPTII,S$GLB,, | Performed by: SURGERY

## 2023-01-27 PROCEDURE — 1160F RVW MEDS BY RX/DR IN RCRD: CPT | Mod: CPTII,S$GLB,, | Performed by: SURGERY

## 2023-01-27 PROCEDURE — 99999 PR PBB SHADOW E&M-EST. PATIENT-LVL III: CPT | Mod: PBBFAC,,, | Performed by: SURGERY

## 2023-01-27 PROCEDURE — 99999 PR PBB SHADOW E&M-EST. PATIENT-LVL III: ICD-10-PCS | Mod: PBBFAC,,, | Performed by: SURGERY

## 2023-01-27 PROCEDURE — 1159F PR MEDICATION LIST DOCUMENTED IN MEDICAL RECORD: ICD-10-PCS | Mod: CPTII,S$GLB,, | Performed by: SURGERY

## 2023-01-27 PROCEDURE — 1159F MED LIST DOCD IN RCRD: CPT | Mod: CPTII,S$GLB,, | Performed by: SURGERY

## 2023-01-27 PROCEDURE — 3074F PR MOST RECENT SYSTOLIC BLOOD PRESSURE < 130 MM HG: ICD-10-PCS | Mod: CPTII,S$GLB,, | Performed by: SURGERY

## 2023-01-27 NOTE — PROGRESS NOTES
[facility]  POST-OP NOTE    HPI: Du Hamilton is a 32 y.o. male s/p lap appy.  Doing well.  No complaints.  Pre-op symptoms resolved.      VITALS:  Vitals:    01/27/23 0902   BP: 126/81   Pulse: 70       PHYSICAL EXAM:  GEN: NAD  HEENT: NCAT  ABD: incisions C/D/I    PATHOLOGY:  Reviewed      ASSESSMENT & PLAN:  32 y.o. male s/p lap appy.  RTC as needed.  Ok to resume normal activity.       Jon Stevenson M.D., F.A.C.S.  [facility] and General Surgery  [facility] [place]

## 2023-01-27 NOTE — LETTER
January 27, 2023    Du Hamilton  [address] [place] [address]         [facility]  [address]  [facility] [address]  Phone: [phone] January 27, 2023     Patient: Du Hamilton   YOB: 1990   Date of Visit: 1/27/2023       To Whom It May Concern:    Du Hamilton may return to full duty work on 01/30/2023 with no restrictions.    If you have any questions or concerns, please don't hesitate to call.    Sincerely,        Jon Stevenson MD

## 2023-03-06 ENCOUNTER — PATIENT OUTREACH (OUTPATIENT)
Dept: ADMINISTRATIVE | Facility: HOSPITAL | Age: 33
End: 2023-03-06
Payer: COMMERCIAL

## 2023-03-06 ENCOUNTER — PATIENT MESSAGE (OUTPATIENT)
Dept: ADMINISTRATIVE | Facility: HOSPITAL | Age: 33
End: 2023-03-06
Payer: COMMERCIAL

## 2023-03-06 NOTE — PROGRESS NOTES
Care Everywhere updates requested and reviewed.  Immunizations reconciled. Media reports reviewed.  Duplicate HM overrides and  orders removed.  Overdue HM topic chart audit and/or requested.  Overdue lab testing linked to upcoming lab appointments if applies.      Health Maintenance Due   Topic Date Due    Hepatitis C Screening  Never done    COVID-19 Vaccine (1) Never done    Influenza Vaccine (1) Never done

## 2023-10-21 ENCOUNTER — OFFICE VISIT (OUTPATIENT)
Dept: URGENT CARE | Facility: CLINIC | Age: 33
End: 2023-10-21
Payer: COMMERCIAL

## 2023-10-21 VITALS
SYSTOLIC BLOOD PRESSURE: 124 MMHG | DIASTOLIC BLOOD PRESSURE: 78 MMHG | RESPIRATION RATE: 16 BRPM | OXYGEN SATURATION: 96 % | HEIGHT: 68 IN | WEIGHT: 185 LBS | TEMPERATURE: 98 F | HEART RATE: 102 BPM | BODY MASS INDEX: 28.04 KG/M2

## 2023-10-21 DIAGNOSIS — Z20.2 STD EXPOSURE: Primary | ICD-10-CM

## 2023-10-21 LAB
BILIRUB UR QL STRIP: NEGATIVE
GLUCOSE UR QL STRIP: NEGATIVE
KETONES UR QL STRIP: NEGATIVE
LEUKOCYTE ESTERASE UR QL STRIP: NEGATIVE
PH, POC UA: 5
POC BLOOD, URINE: NEGATIVE
POC NITRATES, URINE: NEGATIVE
PROT UR QL STRIP: NEGATIVE
SP GR UR STRIP: 1.02 (ref 1–1.03)
UROBILINOGEN UR STRIP-ACNC: NORMAL (ref 0.3–2.2)

## 2023-10-21 PROCEDURE — 96372 PR INJECTION,THERAP/PROPH/DIAG2ST, IM OR SUBCUT: ICD-10-PCS | Mod: S$GLB,,, | Performed by: FAMILY MEDICINE

## 2023-10-21 PROCEDURE — 99203 PR OFFICE/OUTPT VISIT, NEW, LEVL III, 30-44 MIN: ICD-10-PCS | Mod: 25,S$GLB,, | Performed by: FAMILY MEDICINE

## 2023-10-21 PROCEDURE — 81003 URINALYSIS AUTO W/O SCOPE: CPT | Mod: QW,S$GLB,, | Performed by: FAMILY MEDICINE

## 2023-10-21 PROCEDURE — 99203 OFFICE O/P NEW LOW 30 MIN: CPT | Mod: 25,S$GLB,, | Performed by: FAMILY MEDICINE

## 2023-10-21 PROCEDURE — 96372 THER/PROPH/DIAG INJ SC/IM: CPT | Mod: S$GLB,,, | Performed by: FAMILY MEDICINE

## 2023-10-21 PROCEDURE — 81003 POCT URINALYSIS, DIPSTICK, AUTOMATED, W/O SCOPE: ICD-10-PCS | Mod: QW,S$GLB,, | Performed by: FAMILY MEDICINE

## 2023-10-21 RX ORDER — MELATONIN 100 %
POWDER (GRAM) MISCELLANEOUS
COMMUNITY
Start: 2023-10-20

## 2023-10-21 RX ORDER — AMOXICILLIN 500 MG/1
500 CAPSULE ORAL EVERY 8 HOURS
COMMUNITY
Start: 2023-09-26

## 2023-10-21 RX ORDER — CEFTRIAXONE 500 MG/1
500 INJECTION, POWDER, FOR SOLUTION INTRAMUSCULAR; INTRAVENOUS
Status: COMPLETED | OUTPATIENT
Start: 2023-10-21 | End: 2023-10-21

## 2023-10-21 RX ORDER — DOXYCYCLINE 100 MG/1
100 CAPSULE ORAL 2 TIMES DAILY
Qty: 20 CAPSULE | Refills: 0 | Status: SHIPPED | OUTPATIENT
Start: 2023-10-21 | End: 2023-10-31

## 2023-10-21 RX ADMIN — CEFTRIAXONE 500 MG: 500 INJECTION, POWDER, FOR SOLUTION INTRAMUSCULAR; INTRAVENOUS at 04:10

## 2023-10-21 NOTE — PROGRESS NOTES
"Subjective:      Patient ID: Du Hamilton is a 33 y.o. male.    Vitals:  height is 5' 8" (1.727 m) and weight is 83.9 kg (185 lb). His oral temperature is 98.3 °F (36.8 °C). His blood pressure is 124/78 and his pulse is 102. His respiration is 16 and oxygen saturation is 96%.     Chief Complaint: Exposure to STD    This is a 33 y.o. male who presents today with a chief complaint of   Exposure to STD     Symptoms:  No   Exposed to partner unprotected 2 days ago, she tested positive for Chlamydia  Denies any discharge  He himself has been  [facility] for [facility]      Exposure to STD  The patient's pertinent negatives include no penile discharge or penile pain.       Genitourinary:  Negative for penile discharge and penile pain.      Objective:     Physical Exam   Constitutional: He is oriented to person, place, and time. He appears well-developed. He is cooperative.  Non-toxic appearance. He does not appear ill. No distress.   HENT:   Head: Normocephalic and atraumatic.   Ears:   Right Ear: Hearing, tympanic membrane, external ear and ear canal normal.   Left Ear: Hearing, tympanic membrane, external ear and ear canal normal.   Nose: Nose normal. No mucosal edema, rhinorrhea or nasal deformity. No epistaxis. Right sinus exhibits no maxillary sinus tenderness and no frontal sinus tenderness. Left sinus exhibits no maxillary sinus tenderness and no frontal sinus tenderness.   Mouth/Throat: Uvula is midline, oropharynx is clear and moist and mucous membranes are normal. No trismus in the jaw. Normal dentition. No uvula swelling. No posterior oropharyngeal erythema. Oropharynx is clear.   Eyes: Conjunctivae and lids are normal. Right eye exhibits no discharge. Left eye exhibits no discharge. No scleral icterus.   Neck: Trachea normal and phonation normal. Neck supple.   Cardiovascular: Normal rate, regular rhythm, normal heart sounds and normal pulses.   Pulmonary/Chest: Effort normal and breath " sounds normal. No respiratory distress.   Abdominal: Normal appearance and bowel sounds are normal. He exhibits no distension and no mass. Soft. There is no abdominal tenderness.   Musculoskeletal: Normal range of motion.         General: No deformity. Normal range of motion.   Neurological: He is alert and oriented to person, place, and time. He exhibits normal muscle tone. Coordination normal.   Skin: Skin is warm, dry, intact, not diaphoretic and not pale.   Psychiatric: His speech is normal and behavior is normal. Judgment and thought content normal.   Nursing note and vitals reviewed.      Assessment:     1. STD exposure        Plan:       STD exposure  -     POCT Urinalysis, Dipstick, Automated, W/O Scope    Other orders  -     doxycycline (VIBRAMYCIN) 100 MG Cap; Take 1 capsule (100 mg total) by mouth 2 (two) times daily. for 10 days  Dispense: 20 capsule; Refill: 0  -     cefTRIAXone injection 500 mg       Advised Tx of partner

## 2023-10-25 ENCOUNTER — TELEPHONE (OUTPATIENT)
Dept: URGENT CARE | Facility: CLINIC | Age: 33
End: 2023-10-25
Payer: COMMERCIAL

## 2023-10-25 NOTE — TELEPHONE ENCOUNTER
----- Message from Zoe Garces sent at 10/25/2023  2:59 PM CDT -----  Contact: [phone] pt  [email or web address]     Patient is calling for Medical Advice regarding:Pt is requesting a call back in regards to some test results.    How long has patient had these symptoms:    Pharmacy name and phone#:    Would like response via [employer]:  call back    Comments:Please call and advise

## 2023-10-25 NOTE — TELEPHONE ENCOUNTER
Spoke with patient regarding test results question and let him know there were no tests done. Patient wasn't having any symptoms. The only thing done was a UA

## 2024-03-15 ENCOUNTER — PATIENT OUTREACH (OUTPATIENT)
Dept: ADMINISTRATIVE | Facility: HOSPITAL | Age: 34
End: 2024-03-15
Payer: COMMERCIAL

## 2024-03-15 NOTE — PROGRESS NOTES
Population Health Chart Review & Patient Outreach Details      Additional [facility] Health Notes:               Updates Requested / Reviewed:      Updated Care Coordination Note, Care Everywhere, and Immunizations Reconciliation Completed or Queried: [place]         Health Maintenance Topics Overdue:      [facility] Score: 1     Hemoglobin A1c                       Health Maintenance Topic(s) Outreach Outcomes & Actions Taken:    Provider Pt Reatt[employer] - Outreach Outcomes & Actions Taken  : Telephone outreach to update PCP, LM.

## 2024-07-08 ENCOUNTER — HOSPITAL ENCOUNTER (EMERGENCY)
Facility: HOSPITAL | Age: 34
Discharge: HOME | End: 2024-07-08
Attending: STUDENT IN AN ORGANIZED HEALTH CARE EDUCATION/TRAINING PROGRAM
Payer: COMMERCIAL

## 2024-07-08 VITALS
WEIGHT: 190 LBS | DIASTOLIC BLOOD PRESSURE: 92 MMHG | OXYGEN SATURATION: 96 % | SYSTOLIC BLOOD PRESSURE: 147 MMHG | HEART RATE: 80 BPM | RESPIRATION RATE: 16 BRPM | BODY MASS INDEX: 28.89 KG/M2 | TEMPERATURE: 98 F

## 2024-07-08 DIAGNOSIS — B34.9 ACUTE VIRAL SYNDROME: Primary | ICD-10-CM

## 2024-07-08 DIAGNOSIS — Z20.822 LAB TEST NEGATIVE FOR COVID-19 VIRUS: ICD-10-CM

## 2024-07-08 LAB
INFLUENZA A, MOLECULAR: NEGATIVE
INFLUENZA B, MOLECULAR: NEGATIVE
SARS-COV-2 RDRP RESP QL NAA+PROBE: NEGATIVE
SPECIMEN SOURCE: NORMAL

## 2024-07-08 PROCEDURE — 87502 INFLUENZA DNA AMP PROBE: CPT | Performed by: PHYSICIAN ASSISTANT

## 2024-07-08 PROCEDURE — 99283 EMERGENCY DEPT VISIT LOW MDM: CPT

## 2024-07-08 PROCEDURE — U0002 COVID-19 LAB TEST NON-CDC: HCPCS | Performed by: PHYSICIAN ASSISTANT

## 2024-07-08 RX ORDER — IBUPROFEN 600 MG/1
600 TABLET ORAL EVERY 6 HOURS PRN
Qty: 30 TABLET | Refills: 0 | Status: SHIPPED | OUTPATIENT
Start: 2024-07-08

## 2024-07-08 RX ORDER — CETIRIZINE HYDROCHLORIDE 10 MG/1
10 TABLET ORAL DAILY
Qty: 30 TABLET | Refills: 0 | Status: SHIPPED | OUTPATIENT
Start: 2024-07-08 | End: 2025-07-08
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
## 2024-07-08 NOTE — Clinical Note
"Du "Manuela Hamilton was seen and treated in our emergency department on 7/8/2024.     COVID-19 is present in our communities across the state. There is limited testing for COVID at this time, so not all patients can be tested. In this situation, your employee meets the following criteria:    Du Hamilton has met the criteria for COVID-19 testing and has a NEGATIVE result. The employee can return to work once they are asymptomatic for 24 hours without the use of fever reducing medications (Tylenol, Motrin, etc).     If the employee is not fully vaccinated and had a close contact:  · Retest at 5 to 7 days post-exposure  · If possible, it is recommended that they quarantine for 5 days from the time of contact regardless of their test status.  · A mask should be worn post quarantine for 5 days.    If you have any questions or concerns, or if I can be of further assistance, please do not hesitate to contact me.    Sincerely,             Kobi Valentine PA-C"

## 2024-07-08 NOTE — DISCHARGE INSTRUCTIONS

## 2024-07-08 NOTE — ED TRIAGE NOTES
Pt presents to ED today c/o cold sweats, congestion, and body aches onset this am   Reports taking zyrtec and ibuprofen PTA

## (undated) DEVICE — GLOVE SURGICAL LATEX SZ 8

## (undated) DEVICE — TROCAR ENDOPATH XCEL 5X75MM

## (undated) DEVICE — TROCAR ENDOPATH XCEL 5MM 7.5CM

## (undated) DEVICE — SUT MONOCYRL 4-0 PS2 UND

## (undated) DEVICE — BAG TISSUE RETRIEVAL 225ML

## (undated) DEVICE — Device

## (undated) DEVICE — SUPPORT ULNA NERVE PROTECTOR

## (undated) DEVICE — CONTAINER MULTIPURPOSE/SPECIME

## (undated) DEVICE — ELECTRODE REM PLYHSV RETURN 9

## (undated) DEVICE — MANIFOLD 4 PORT

## (undated) DEVICE — TROCAR ENDOPATH XCEL 12X100MM

## (undated) DEVICE — SUT 0 VICRYL / UR6 (J603)

## (undated) DEVICE — SCISSOR 5MMX35CM DIRECT DRIVE

## (undated) DEVICE — NDL HYPO REG 25G X 1 1/2